# Patient Record
Sex: FEMALE | Race: WHITE | NOT HISPANIC OR LATINO | Employment: OTHER | URBAN - METROPOLITAN AREA
[De-identification: names, ages, dates, MRNs, and addresses within clinical notes are randomized per-mention and may not be internally consistent; named-entity substitution may affect disease eponyms.]

---

## 2017-01-03 ENCOUNTER — COMMUNICATION - HEALTHEAST (OUTPATIENT)
Dept: TELEHEALTH | Facility: CLINIC | Age: 37
End: 2017-01-03

## 2017-01-03 ENCOUNTER — PRENATAL OFFICE VISIT - HEALTHEAST (OUTPATIENT)
Dept: FAMILY MEDICINE | Facility: CLINIC | Age: 37
End: 2017-01-03

## 2017-01-03 DIAGNOSIS — Z34.92 SUPERVISION OF NORMAL PREGNANCY IN SECOND TRIMESTER: ICD-10-CM

## 2017-01-06 ENCOUNTER — HOSPITAL ENCOUNTER (OUTPATIENT)
Dept: ULTRASOUND IMAGING | Facility: CLINIC | Age: 37
Discharge: HOME | End: 2017-01-06
Attending: FAMILY MEDICINE

## 2017-01-06 DIAGNOSIS — Z34.92 SUPERVISION OF NORMAL PREGNANCY IN SECOND TRIMESTER: ICD-10-CM

## 2017-01-31 ENCOUNTER — PRENATAL OFFICE VISIT - HEALTHEAST (OUTPATIENT)
Dept: FAMILY MEDICINE | Facility: CLINIC | Age: 37
End: 2017-01-31

## 2017-01-31 DIAGNOSIS — Z34.92 SUPERVISION OF NORMAL PREGNANCY IN SECOND TRIMESTER: ICD-10-CM

## 2017-02-21 ENCOUNTER — PRENATAL OFFICE VISIT - HEALTHEAST (OUTPATIENT)
Dept: FAMILY MEDICINE | Facility: CLINIC | Age: 37
End: 2017-02-21

## 2017-02-21 ENCOUNTER — RECORDS - HEALTHEAST (OUTPATIENT)
Dept: ADMINISTRATIVE | Facility: OTHER | Age: 37
End: 2017-02-21

## 2017-02-21 DIAGNOSIS — Z34.92 SUPERVISION OF NORMAL PREGNANCY IN SECOND TRIMESTER: ICD-10-CM

## 2017-02-21 DIAGNOSIS — Z67.91 RH NEGATIVE STATUS DURING PREGNANCY, THIRD TRIMESTER: ICD-10-CM

## 2017-02-21 DIAGNOSIS — O26.893 RH NEGATIVE STATUS DURING PREGNANCY, THIRD TRIMESTER: ICD-10-CM

## 2017-02-22 ENCOUNTER — COMMUNICATION - HEALTHEAST (OUTPATIENT)
Dept: FAMILY MEDICINE | Facility: CLINIC | Age: 37
End: 2017-02-22

## 2017-02-22 LAB — SYPHILIS RPR SCREEN - HISTORICAL: NORMAL

## 2017-03-20 ENCOUNTER — HOSPITAL ENCOUNTER (OUTPATIENT)
Dept: ULTRASOUND IMAGING | Facility: CLINIC | Age: 37
Discharge: HOME | End: 2017-03-20
Attending: FAMILY MEDICINE

## 2017-03-20 DIAGNOSIS — Z34.92 SUPERVISION OF NORMAL PREGNANCY IN SECOND TRIMESTER: ICD-10-CM

## 2017-03-21 ENCOUNTER — PRENATAL OFFICE VISIT - HEALTHEAST (OUTPATIENT)
Dept: FAMILY MEDICINE | Facility: CLINIC | Age: 37
End: 2017-03-21

## 2017-03-21 DIAGNOSIS — Z34.92 SUPERVISION OF NORMAL PREGNANCY IN SECOND TRIMESTER: ICD-10-CM

## 2017-04-04 ENCOUNTER — COMMUNICATION - HEALTHEAST (OUTPATIENT)
Dept: FAMILY MEDICINE | Facility: CLINIC | Age: 37
End: 2017-04-04

## 2017-04-06 ENCOUNTER — COMMUNICATION - HEALTHEAST (OUTPATIENT)
Dept: FAMILY MEDICINE | Facility: CLINIC | Age: 37
End: 2017-04-06

## 2017-04-07 ENCOUNTER — PRENATAL OFFICE VISIT - HEALTHEAST (OUTPATIENT)
Dept: FAMILY MEDICINE | Facility: CLINIC | Age: 37
End: 2017-04-07

## 2017-04-07 DIAGNOSIS — Z34.92 SUPERVISION OF NORMAL PREGNANCY IN SECOND TRIMESTER: ICD-10-CM

## 2017-04-07 DIAGNOSIS — R11.0 NAUSEA: ICD-10-CM

## 2017-04-07 LAB
ALT SERPL W P-5'-P-CCNC: 23 U/L (ref 0–45)
AST SERPL W P-5'-P-CCNC: 20 U/L (ref 0–40)
CREAT SERPL-MCNC: 0.73 MG/DL (ref 0.6–1.1)
GFR SERPL CREATININE-BSD FRML MDRD: >60 ML/MIN/1.73M2

## 2017-04-17 ENCOUNTER — PRENATAL OFFICE VISIT - HEALTHEAST (OUTPATIENT)
Dept: FAMILY MEDICINE | Facility: CLINIC | Age: 37
End: 2017-04-17

## 2017-04-17 DIAGNOSIS — Z34.92 SUPERVISION OF NORMAL PREGNANCY IN SECOND TRIMESTER: ICD-10-CM

## 2017-04-24 ENCOUNTER — COMMUNICATION - HEALTHEAST (OUTPATIENT)
Dept: TELEHEALTH | Facility: CLINIC | Age: 37
End: 2017-04-24

## 2017-04-24 ENCOUNTER — PRENATAL OFFICE VISIT - HEALTHEAST (OUTPATIENT)
Dept: FAMILY MEDICINE | Facility: CLINIC | Age: 37
End: 2017-04-24

## 2017-04-24 DIAGNOSIS — Z34.92 SUPERVISION OF NORMAL PREGNANCY IN SECOND TRIMESTER: ICD-10-CM

## 2017-05-01 ENCOUNTER — PRENATAL OFFICE VISIT - HEALTHEAST (OUTPATIENT)
Dept: FAMILY MEDICINE | Facility: CLINIC | Age: 37
End: 2017-05-01

## 2017-05-01 DIAGNOSIS — Z34.83 ENCOUNTER FOR SUPERVISION OF OTHER NORMAL PREGNANCY IN THIRD TRIMESTER: ICD-10-CM

## 2017-05-01 DIAGNOSIS — Z64.1 GRAND MULTIPARITY: ICD-10-CM

## 2017-05-04 ENCOUNTER — COMMUNICATION - HEALTHEAST (OUTPATIENT)
Dept: SCHEDULING | Facility: CLINIC | Age: 37
End: 2017-05-04

## 2017-05-04 ENCOUNTER — HOSPITAL ENCOUNTER (OUTPATIENT)
Dept: MEDSURG UNIT | Facility: CLINIC | Age: 37
Discharge: HOME | End: 2017-05-04
Attending: FAMILY MEDICINE | Admitting: FAMILY MEDICINE

## 2017-05-08 ENCOUNTER — PRENATAL OFFICE VISIT - HEALTHEAST (OUTPATIENT)
Dept: FAMILY MEDICINE | Facility: CLINIC | Age: 37
End: 2017-05-08

## 2017-05-08 DIAGNOSIS — Z34.92 SUPERVISION OF NORMAL PREGNANCY IN SECOND TRIMESTER: ICD-10-CM

## 2017-05-15 ENCOUNTER — ANESTHESIA - HEALTHEAST (OUTPATIENT)
Dept: OBGYN | Facility: CLINIC | Age: 37
End: 2017-05-15

## 2017-05-15 ENCOUNTER — HOSPITAL ENCOUNTER (OUTPATIENT)
Dept: MEDSURG UNIT | Facility: CLINIC | Age: 37
Discharge: HOME | End: 2017-05-15
Attending: FAMILY MEDICINE | Admitting: FAMILY MEDICINE

## 2017-05-15 ASSESSMENT — MIFFLIN-ST. JEOR: SCORE: 1684.66

## 2017-05-24 ENCOUNTER — COMMUNICATION - HEALTHEAST (OUTPATIENT)
Dept: OBGYN | Facility: CLINIC | Age: 37
End: 2017-05-24

## 2017-06-01 ENCOUNTER — COMMUNICATION - HEALTHEAST (OUTPATIENT)
Dept: OBGYN | Facility: CLINIC | Age: 37
End: 2017-06-01

## 2017-06-27 ENCOUNTER — OFFICE VISIT - HEALTHEAST (OUTPATIENT)
Dept: FAMILY MEDICINE | Facility: CLINIC | Age: 37
End: 2017-06-27

## 2017-06-27 DIAGNOSIS — Z30.09 ENCOUNTER FOR OTHER GENERAL COUNSELING OR ADVICE ON CONTRACEPTION: ICD-10-CM

## 2017-06-27 DIAGNOSIS — N89.8 VAGINAL DISCHARGE: ICD-10-CM

## 2017-06-27 DIAGNOSIS — F41.1 ANXIETY STATE: ICD-10-CM

## 2017-06-27 DIAGNOSIS — F34.1 DYSTHYMIC DISORDER: ICD-10-CM

## 2017-06-27 ASSESSMENT — MIFFLIN-ST. JEOR: SCORE: 1648.83

## 2017-09-19 ENCOUNTER — COMMUNICATION - HEALTHEAST (OUTPATIENT)
Dept: FAMILY MEDICINE | Facility: CLINIC | Age: 37
End: 2017-09-19

## 2017-09-26 ENCOUNTER — AMBULATORY - HEALTHEAST (OUTPATIENT)
Dept: FAMILY MEDICINE | Facility: CLINIC | Age: 37
End: 2017-09-26

## 2017-09-26 ENCOUNTER — AMBULATORY - HEALTHEAST (OUTPATIENT)
Dept: NURSING | Facility: CLINIC | Age: 37
End: 2017-09-26

## 2017-09-26 DIAGNOSIS — Z30.9 CONTRACEPTION MANAGEMENT: ICD-10-CM

## 2017-10-20 ENCOUNTER — COMMUNICATION - HEALTHEAST (OUTPATIENT)
Dept: FAMILY MEDICINE | Facility: CLINIC | Age: 37
End: 2017-10-20

## 2017-10-20 DIAGNOSIS — F34.1 DYSTHYMIC DISORDER: ICD-10-CM

## 2017-10-26 ENCOUNTER — COMMUNICATION - HEALTHEAST (OUTPATIENT)
Dept: SCHEDULING | Facility: CLINIC | Age: 37
End: 2017-10-26

## 2017-12-27 ENCOUNTER — COMMUNICATION - HEALTHEAST (OUTPATIENT)
Dept: FAMILY MEDICINE | Facility: CLINIC | Age: 37
End: 2017-12-27

## 2017-12-27 DIAGNOSIS — F34.1 DYSTHYMIC DISORDER: ICD-10-CM

## 2018-01-30 ENCOUNTER — COMMUNICATION - HEALTHEAST (OUTPATIENT)
Dept: FAMILY MEDICINE | Facility: CLINIC | Age: 38
End: 2018-01-30

## 2018-01-30 DIAGNOSIS — F34.1 DYSTHYMIC DISORDER: ICD-10-CM

## 2018-02-09 ENCOUNTER — OFFICE VISIT - HEALTHEAST (OUTPATIENT)
Dept: FAMILY MEDICINE | Facility: CLINIC | Age: 38
End: 2018-02-09

## 2018-02-09 DIAGNOSIS — Z30.9 CONTRACEPTIVE MANAGEMENT: ICD-10-CM

## 2018-02-09 DIAGNOSIS — F34.1 DYSTHYMIC DISORDER: ICD-10-CM

## 2018-02-09 LAB
HCG UR QL: NEGATIVE
SP GR UR STRIP: 1.02 (ref 1–1.03)

## 2018-02-09 ASSESSMENT — MIFFLIN-ST. JEOR: SCORE: 1637.04

## 2018-02-15 ENCOUNTER — OFFICE VISIT - HEALTHEAST (OUTPATIENT)
Dept: FAMILY MEDICINE | Facility: CLINIC | Age: 38
End: 2018-02-15

## 2018-02-15 DIAGNOSIS — Z97.5 IUD (INTRAUTERINE DEVICE) IN PLACE: ICD-10-CM

## 2018-02-15 DIAGNOSIS — Z30.49 ENCOUNTER FOR SURVEILLANCE OF OTHER CONTRACEPTIVE: ICD-10-CM

## 2018-02-15 DIAGNOSIS — Z30.430 ENCOUNTER FOR IUD INSERTION: ICD-10-CM

## 2018-02-15 LAB
HCG UR QL: NEGATIVE
SP GR UR STRIP: 1.02 (ref 1–1.03)

## 2018-03-01 ENCOUNTER — COMMUNICATION - HEALTHEAST (OUTPATIENT)
Dept: FAMILY MEDICINE | Facility: CLINIC | Age: 38
End: 2018-03-01

## 2018-03-01 DIAGNOSIS — F34.1 DYSTHYMIC DISORDER: ICD-10-CM

## 2018-03-30 ENCOUNTER — COMMUNICATION - HEALTHEAST (OUTPATIENT)
Dept: FAMILY MEDICINE | Facility: CLINIC | Age: 38
End: 2018-03-30

## 2018-03-30 DIAGNOSIS — F34.1 DYSTHYMIC DISORDER: ICD-10-CM

## 2018-05-22 ENCOUNTER — OFFICE VISIT - HEALTHEAST (OUTPATIENT)
Dept: FAMILY MEDICINE | Facility: CLINIC | Age: 38
End: 2018-05-22

## 2018-05-22 DIAGNOSIS — J02.9 SORE THROAT: ICD-10-CM

## 2018-05-22 DIAGNOSIS — J03.90 ACUTE TONSILLITIS: ICD-10-CM

## 2018-05-22 DIAGNOSIS — R05.9 COUGH: ICD-10-CM

## 2018-05-22 LAB — DEPRECATED S PYO AG THROAT QL EIA: NORMAL

## 2018-05-22 ASSESSMENT — MIFFLIN-ST. JEOR: SCORE: 1651.5

## 2018-05-23 LAB — GROUP A STREP BY PCR: NORMAL

## 2018-05-31 ENCOUNTER — COMMUNICATION - HEALTHEAST (OUTPATIENT)
Dept: FAMILY MEDICINE | Facility: CLINIC | Age: 38
End: 2018-05-31

## 2018-05-31 DIAGNOSIS — F34.1 DYSTHYMIC DISORDER: ICD-10-CM

## 2018-06-15 ENCOUNTER — OFFICE VISIT - HEALTHEAST (OUTPATIENT)
Dept: FAMILY MEDICINE | Facility: CLINIC | Age: 38
End: 2018-06-15

## 2018-06-15 ENCOUNTER — HOSPITAL ENCOUNTER (OUTPATIENT)
Dept: ULTRASOUND IMAGING | Facility: CLINIC | Age: 38
Discharge: HOME | End: 2018-06-15
Attending: FAMILY MEDICINE

## 2018-06-15 DIAGNOSIS — Z30.431 IUD CHECK UP: ICD-10-CM

## 2018-06-15 DIAGNOSIS — N81.2 INCOMPLETE UTEROVAGINAL PROLAPSE: ICD-10-CM

## 2018-06-15 ASSESSMENT — MIFFLIN-ST. JEOR: SCORE: 1646.11

## 2018-07-08 ENCOUNTER — RECORDS - HEALTHEAST (OUTPATIENT)
Dept: ADMINISTRATIVE | Facility: OTHER | Age: 38
End: 2018-07-08

## 2018-09-11 ENCOUNTER — AMBULATORY - HEALTHEAST (OUTPATIENT)
Dept: FAMILY MEDICINE | Facility: CLINIC | Age: 38
End: 2018-09-11

## 2018-09-11 DIAGNOSIS — N61.0 MASTITIS, LEFT, ACUTE: ICD-10-CM

## 2019-02-21 ENCOUNTER — COMMUNICATION - HEALTHEAST (OUTPATIENT)
Dept: FAMILY MEDICINE | Facility: CLINIC | Age: 39
End: 2019-02-21

## 2019-02-21 ENCOUNTER — AMBULATORY - HEALTHEAST (OUTPATIENT)
Dept: FAMILY MEDICINE | Facility: CLINIC | Age: 39
End: 2019-02-21

## 2019-02-21 DIAGNOSIS — F34.1 DYSTHYMIC DISORDER: ICD-10-CM

## 2019-06-19 ENCOUNTER — COMMUNICATION - HEALTHEAST (OUTPATIENT)
Dept: FAMILY MEDICINE | Facility: CLINIC | Age: 39
End: 2019-06-19

## 2019-06-19 DIAGNOSIS — F34.1 DYSTHYMIC DISORDER: ICD-10-CM

## 2019-07-20 ENCOUNTER — COMMUNICATION - HEALTHEAST (OUTPATIENT)
Dept: FAMILY MEDICINE | Facility: CLINIC | Age: 39
End: 2019-07-20

## 2019-07-20 DIAGNOSIS — F34.1 DYSTHYMIC DISORDER: ICD-10-CM

## 2019-08-08 ENCOUNTER — COMMUNICATION - HEALTHEAST (OUTPATIENT)
Dept: FAMILY MEDICINE | Facility: CLINIC | Age: 39
End: 2019-08-08

## 2019-08-08 DIAGNOSIS — F34.1 DYSTHYMIC DISORDER: ICD-10-CM

## 2019-09-17 ENCOUNTER — COMMUNICATION - HEALTHEAST (OUTPATIENT)
Dept: FAMILY MEDICINE | Facility: CLINIC | Age: 39
End: 2019-09-17

## 2019-09-17 DIAGNOSIS — F34.1 DYSTHYMIC DISORDER: ICD-10-CM

## 2019-10-14 ENCOUNTER — COMMUNICATION - HEALTHEAST (OUTPATIENT)
Dept: FAMILY MEDICINE | Facility: CLINIC | Age: 39
End: 2019-10-14

## 2019-10-14 DIAGNOSIS — F34.1 DYSTHYMIC DISORDER: ICD-10-CM

## 2019-11-04 ENCOUNTER — COMMUNICATION - HEALTHEAST (OUTPATIENT)
Dept: FAMILY MEDICINE | Facility: CLINIC | Age: 39
End: 2019-11-04

## 2019-11-04 DIAGNOSIS — F34.1 DYSTHYMIC DISORDER: ICD-10-CM

## 2019-11-21 ENCOUNTER — AMBULATORY - HEALTHEAST (OUTPATIENT)
Dept: NURSING | Facility: CLINIC | Age: 39
End: 2019-11-21

## 2019-11-21 ENCOUNTER — COMMUNICATION - HEALTHEAST (OUTPATIENT)
Dept: FAMILY MEDICINE | Facility: CLINIC | Age: 39
End: 2019-11-21

## 2019-11-21 DIAGNOSIS — F34.1 DYSTHYMIC DISORDER: ICD-10-CM

## 2019-12-20 ENCOUNTER — COMMUNICATION - HEALTHEAST (OUTPATIENT)
Dept: FAMILY MEDICINE | Facility: CLINIC | Age: 39
End: 2019-12-20

## 2019-12-20 DIAGNOSIS — F34.1 DYSTHYMIC DISORDER: ICD-10-CM

## 2020-01-08 ENCOUNTER — RECORDS - HEALTHEAST (OUTPATIENT)
Dept: ADMINISTRATIVE | Facility: OTHER | Age: 40
End: 2020-01-08

## 2020-01-08 ENCOUNTER — COMMUNICATION - HEALTHEAST (OUTPATIENT)
Dept: SCHEDULING | Facility: CLINIC | Age: 40
End: 2020-01-08

## 2020-02-13 ENCOUNTER — OFFICE VISIT - HEALTHEAST (OUTPATIENT)
Dept: FAMILY MEDICINE | Facility: CLINIC | Age: 40
End: 2020-02-13

## 2020-02-13 DIAGNOSIS — F34.1 DYSTHYMIC DISORDER: ICD-10-CM

## 2020-02-13 ASSESSMENT — PATIENT HEALTH QUESTIONNAIRE - PHQ9: SUM OF ALL RESPONSES TO PHQ QUESTIONS 1-9: 23

## 2020-02-13 ASSESSMENT — MIFFLIN-ST. JEOR: SCORE: 1684.67

## 2020-05-19 ENCOUNTER — COMMUNICATION - HEALTHEAST (OUTPATIENT)
Dept: SCHEDULING | Facility: CLINIC | Age: 40
End: 2020-05-19

## 2020-05-19 ENCOUNTER — OFFICE VISIT - HEALTHEAST (OUTPATIENT)
Dept: FAMILY MEDICINE | Facility: CLINIC | Age: 40
End: 2020-05-19

## 2020-05-19 DIAGNOSIS — N93.9 VAGINAL SPOTTING: ICD-10-CM

## 2020-05-19 DIAGNOSIS — Z30.431 IUD CHECK UP: ICD-10-CM

## 2020-05-19 ASSESSMENT — PATIENT HEALTH QUESTIONNAIRE - PHQ9: SUM OF ALL RESPONSES TO PHQ QUESTIONS 1-9: 10

## 2020-08-08 ENCOUNTER — COMMUNICATION - HEALTHEAST (OUTPATIENT)
Dept: FAMILY MEDICINE | Facility: CLINIC | Age: 40
End: 2020-08-08

## 2020-08-08 DIAGNOSIS — F34.1 DYSTHYMIC DISORDER: ICD-10-CM

## 2020-11-05 ENCOUNTER — RECORDS - HEALTHEAST (OUTPATIENT)
Dept: ADMINISTRATIVE | Facility: OTHER | Age: 40
End: 2020-11-05

## 2021-05-10 ENCOUNTER — COMMUNICATION - HEALTHEAST (OUTPATIENT)
Dept: FAMILY MEDICINE | Facility: CLINIC | Age: 41
End: 2021-05-10

## 2021-05-10 DIAGNOSIS — F34.1 DYSTHYMIC DISORDER: ICD-10-CM

## 2021-05-11 RX ORDER — BUPROPION HYDROCHLORIDE 300 MG/1
TABLET ORAL
Qty: 90 TABLET | Refills: 0 | Status: SHIPPED | OUTPATIENT
Start: 2021-05-11 | End: 2021-08-12

## 2021-05-26 ASSESSMENT — PATIENT HEALTH QUESTIONNAIRE - PHQ9: SUM OF ALL RESPONSES TO PHQ QUESTIONS 1-9: 10

## 2021-05-27 ASSESSMENT — PATIENT HEALTH QUESTIONNAIRE - PHQ9: SUM OF ALL RESPONSES TO PHQ QUESTIONS 1-9: 23

## 2021-05-29 NOTE — TELEPHONE ENCOUNTER
RN cannot approve Refill Request    RN can NOT refill this medication overdue for office visits and/or labs.    Dakotah Mcadams, [facility] Triage/Med Refill 6/19/2019    Requested Prescriptions   Pending Prescriptions Disp Refills     buPROPion (WELLBUTRIN XL) 150 MG 24 hr tablet [Pharmacy Med Name: BUPROPION XL 150MG TABLETS (24 H)] 30 tablet 0     Sig: TAKE 1 TABLET BY MOUTH EVERY MORNING       Tricyclics/Misc Antidepressant/Antianxiety Meds Refill Protocol Failed - 6/19/2019  3:26 AM        Failed - PCP or prescribing provider visit in last year     Last office visit with prescriber/PCP: 6/15/2018 Nayeli Webb MD OR same dept: Visit date not found OR same specialty: 6/15/2018 Nayeli Webb MD  Last physical: Visit date not found Last MTM visit: Visit date not found   Next visit within 3 mo: Visit date not found  Next physical within 3 mo: Visit date not found  Prescriber OR PCP: Nayeli Webb MD  Last diagnosis associated with med order: 1. Dysthymic disorder  - buPROPion (WELLBUTRIN XL) 150 MG 24 hr tablet [Pharmacy Med Name: BUPROPION XL 150MG TABLETS (24 H)]; TAKE 1 TABLET BY MOUTH EVERY MORNING  Dispense: 30 tablet; Refill: 0    If protocol passes may refill for 12 months if within 3 months of last provider visit (or a total of 15 months).

## 2021-05-30 VITALS — BODY MASS INDEX: 31.66 KG/M2 | WEIGHT: 208.25 LBS

## 2021-05-30 VITALS — BODY MASS INDEX: 32.39 KG/M2 | WEIGHT: 213 LBS

## 2021-05-30 VITALS — WEIGHT: 213 LBS | BODY MASS INDEX: 32.28 KG/M2 | HEIGHT: 68 IN

## 2021-05-30 VITALS — WEIGHT: 205.06 LBS | BODY MASS INDEX: 31.18 KG/M2

## 2021-05-30 VITALS — WEIGHT: 210.19 LBS | BODY MASS INDEX: 31.96 KG/M2

## 2021-05-30 VITALS — BODY MASS INDEX: 30.78 KG/M2 | WEIGHT: 202.44 LBS

## 2021-05-30 VITALS — BODY MASS INDEX: 29.38 KG/M2 | WEIGHT: 193.25 LBS

## 2021-05-30 VITALS — BODY MASS INDEX: 32.4 KG/M2 | WEIGHT: 213.06 LBS

## 2021-05-30 VITALS — BODY MASS INDEX: 31.68 KG/M2 | WEIGHT: 208.38 LBS

## 2021-05-30 NOTE — TELEPHONE ENCOUNTER
Refill Given    Refill given per Policy, patient informed they are overdue for Office Visit   OV 6/15/18    Desiree Babcock, [facility] Connection Triage/Med Refill 7/20/2019    Requested Prescriptions   Pending Prescriptions Disp Refills     buPROPion (WELLBUTRIN XL) 150 MG 24 hr tablet [Pharmacy Med Name: BUPROPION XL 150MG TABLETS (24 H)] 30 tablet 0     Sig: TAKE 1 TABLET BY MOUTH EVERY MORNING       Tricyclics/Misc Antidepressant/Antianxiety Meds Refill Protocol Failed - 7/20/2019  3:26 AM        Failed - PCP or prescribing provider visit in last year     Last office visit with prescriber/PCP: Visit date not found OR same dept: Visit date not found OR same specialty: 6/15/2018 Nayeli Webb MD  Last physical: Visit date not found Last MTM visit: Visit date not found   Next visit within 3 mo: Visit date not found  Next physical within 3 mo: Visit date not found  Prescriber OR PCP: Familia Tam MD  Last diagnosis associated with med order: 1. Dysthymic disorder  - buPROPion (WELLBUTRIN XL) 150 MG 24 hr tablet [Pharmacy Med Name: BUPROPION XL 150MG TABLETS (24 H)]; TAKE 1 TABLET BY MOUTH EVERY MORNING  Dispense: 30 tablet; Refill: 0    If protocol passes may refill for 12 months if within 3 months of last provider visit (or a total of 15 months).

## 2021-05-31 ENCOUNTER — RECORDS - HEALTHEAST (OUTPATIENT)
Dept: ADMINISTRATIVE | Facility: CLINIC | Age: 41
End: 2021-05-31

## 2021-05-31 VITALS — WEIGHT: 205.1 LBS | BODY MASS INDEX: 31.08 KG/M2 | HEIGHT: 68 IN

## 2021-05-31 VITALS — WEIGHT: 213.9 LBS | BODY MASS INDEX: 33.5 KG/M2

## 2021-05-31 VITALS — BODY MASS INDEX: 32.33 KG/M2 | WEIGHT: 206 LBS | HEIGHT: 67 IN

## 2021-05-31 NOTE — TELEPHONE ENCOUNTER
RN cannot approve Refill Request    RN can NOT refill this medication Protocol failed and NO refill given. Last office visit: 6/15/2018 Nayeli Webb MD Last Physical: Visit date not found Last MTM visit: Visit date not found Last visit same specialty: 6/15/2018 Nayeli Webb MD.  Next visit within 3 mo: Visit date not found  Next physical within 3 mo: Visit date not found      Barbi River, [facility] Triage/Med Refill 8/10/2019    Requested Prescriptions   Pending Prescriptions Disp Refills     buPROPion (WELLBUTRIN XL) 150 MG 24 hr tablet [Pharmacy Med Name: BUPROPION XL 150MG TABLETS (24 H)] 30 tablet 0     Sig: TAKE 1 TABLET(150 MG) BY MOUTH EVERY MORNING       Tricyclics/Misc Antidepressant/Antianxiety Meds Refill Protocol Failed - 8/8/2019 12:04 PM        Failed - PCP or prescribing provider visit in last year     Last office visit with prescriber/PCP: 6/15/2018 Nayeli Webb MD OR same dept: Visit date not found OR same specialty: 6/15/2018 Nayeli Webb MD  Last physical: Visit date not found Last MTM visit: Visit date not found   Next visit within 3 mo: Visit date not found  Next physical within 3 mo: Visit date not found  Prescriber OR PCP: Nayeli Webb MD  Last diagnosis associated with med order: 1. Dysthymic disorder  - buPROPion (WELLBUTRIN XL) 150 MG 24 hr tablet [Pharmacy Med Name: BUPROPION XL 150MG TABLETS (24 H)]; TAKE 1 TABLET(150 MG) BY MOUTH EVERY MORNING  Dispense: 30 tablet; Refill: 0  - sertraline (ZOLOFT) 100 MG tablet [Pharmacy Med Name: SERTRALINE 100MG TABLETS]; TAKE 1 TABLET(100 MG) BY MOUTH DAILY  Dispense: 30 tablet; Refill: 0    If protocol passes may refill for 12 months if within 3 months of last provider visit (or a total of 15 months).             sertraline (ZOLOFT) 100 MG tablet [Pharmacy Med Name: SERTRALINE 100MG TABLETS] 30 tablet 0     Sig: TAKE 1 TABLET(100 MG) BY MOUTH DAILY       [facility] Refill Protocol  Failed -  8/8/2019 12:04 PM        Failed - PCP or prescribing provider visit in last year     Last office visit with prescriber/PCP: 6/15/2018 Nayeli Webb MD OR same dept: Visit date not found OR same specialty: 6/15/2018 Nayeli Webb MD  Last physical: Visit date not found Last MTM visit: Visit date not found   Next visit within 3 mo: Visit date not found  Next physical within 3 mo: Visit date not found  Prescriber OR PCP: Nayeli Webb MD  Last diagnosis associated with med order: 1. Dysthymic disorder  - buPROPion (WELLBUTRIN XL) 150 MG 24 hr tablet [Pharmacy Med Name: BUPROPION XL 150MG TABLETS (24 H)]; TAKE 1 TABLET(150 MG) BY MOUTH EVERY MORNING  Dispense: 30 tablet; Refill: 0  - sertraline (ZOLOFT) 100 MG tablet [Pharmacy Med Name: SERTRALINE 100MG TABLETS]; TAKE 1 TABLET(100 MG) BY MOUTH DAILY  Dispense: 30 tablet; Refill: 0    If protocol passes may refill for 12 months if within 3 months of last provider visit (or a total of 15 months).

## 2021-05-31 NOTE — TELEPHONE ENCOUNTER
Left message for the patient requesting she calls back or schedules via [facility] for a apt when Dr. Webb returns to clinic.

## 2021-05-31 NOTE — TELEPHONE ENCOUNTER
Patient states that she going out of town for three week requesting early refill.  Controlled Substance Refill Request  Medication Name:   Requested Prescriptions     Pending Prescriptions Disp Refills     buPROPion (WELLBUTRIN XL) 150 MG 24 hr tablet 30 tablet 0     Sig: Take 1 tablet (150 mg total) by mouth every morning.     sertraline (ZOLOFT) 100 MG tablet 30 tablet 5     Sig: Take 1 tablet (100 mg total) by mouth daily.     Date Last Fill: 7/20/19  Pharmacy: [facility] #[id number]      Submit electronically to pharmacy  Controlled Substance Agreement Date Scanned:   Encounter-Level CSA Scan Date:    There are no encounter-level csa scan date.       Last office visit with prescriber/PCP: 6/15/2018 Nayeli Webb MD OR same dept: Visit date not found OR same specialty: 6/15/2018 Nayeli Webb MD  Last physical: Visit date not found Last MTM visit: Visit date not found

## 2021-05-31 NOTE — TELEPHONE ENCOUNTER
Please call the patient.    Overdue to be seen.  30 stay sent, but this is absolutely her last fill until being seen.    Tyree Hobson, CNP

## 2021-05-31 NOTE — TELEPHONE ENCOUNTER
Please let the patient know that her refills were sent over last week, but she is overdue for an appointment, can we help her schedule this

## 2021-06-01 ENCOUNTER — RECORDS - HEALTHEAST (OUTPATIENT)
Dept: ADMINISTRATIVE | Facility: CLINIC | Age: 41
End: 2021-06-01

## 2021-06-01 VITALS — WEIGHT: 209.19 LBS | BODY MASS INDEX: 32.83 KG/M2 | HEIGHT: 67 IN

## 2021-06-01 VITALS — WEIGHT: 208 LBS | HEIGHT: 67 IN | BODY MASS INDEX: 32.65 KG/M2

## 2021-06-02 ENCOUNTER — RECORDS - HEALTHEAST (OUTPATIENT)
Dept: ADMINISTRATIVE | Facility: CLINIC | Age: 41
End: 2021-06-02

## 2021-06-02 NOTE — TELEPHONE ENCOUNTER
RN cannot approve Refill Request    RN can NOT refill this medication Protocol failed and NO refill given.    Anh Purvis, [facility] Triage/Med Refill 10/15/2019    Requested Prescriptions   Pending Prescriptions Disp Refills     sertraline (ZOLOFT) 100 MG tablet [Pharmacy Med Name: SERTRALINE 100MG TABLETS] 30 tablet 0     Sig: TAKE ONE TABLET BY MOUTH ONCE DAILY       [facility] Refill Protocol  Failed - 10/14/2019  3:28 AM        Failed - PCP or prescribing provider visit in last year     Last office visit with prescriber/PCP: 6/15/2018 Nayeli Webb MD OR same dept: Visit date not found OR same specialty: 6/15/2018 Nayeli Webb MD  Last physical: Visit date not found Last MTM visit: Visit date not found   Next visit within 3 mo: Visit date not found  Next physical within 3 mo: Visit date not found  Prescriber OR PCP: Nayeli Webb MD  Last diagnosis associated with med order: 1. Dysthymic disorder  - sertraline (ZOLOFT) 100 MG tablet [Pharmacy Med Name: SERTRALINE 100MG TABLETS]; TAKE ONE TABLET BY MOUTH ONCE DAILY  Dispense: 30 tablet; Refill: 0  - buPROPion (WELLBUTRIN XL) 150 MG 24 hr tablet [Pharmacy Med Name: BUPROPION XL 150MG TABLETS (24 H)]; TAKE ONE TABLET BY MOUTH EVERY MORNING  Dispense: 30 tablet; Refill: 0    If protocol passes may refill for 12 months if within 3 months of last provider visit (or a total of 15 months).             buPROPion (WELLBUTRIN XL) 150 MG 24 hr tablet [Pharmacy Med Name: BUPROPION XL 150MG TABLETS (24 H)] 30 tablet 0     Sig: TAKE ONE TABLET BY MOUTH EVERY MORNING       Tricyclics/Misc Antidepressant/Antianxiety Meds Refill Protocol Failed - 10/14/2019  3:28 AM        Failed - PCP or prescribing provider visit in last year     Last office visit with prescriber/PCP: 6/15/2018 Nayeli Webb MD OR same dept: Visit date not found OR same specialty: 6/15/2018 Nayeli Webb MD  Last physical: Visit date not found Last MTM visit:  Visit date not found   Next visit within 3 mo: Visit date not found  Next physical within 3 mo: Visit date not found  Prescriber OR PCP: Nayeli Webb MD  Last diagnosis associated with med order: 1. Dysthymic disorder  - sertraline (ZOLOFT) 100 MG tablet [Pharmacy Med Name: SERTRALINE 100MG TABLETS]; TAKE ONE TABLET BY MOUTH ONCE DAILY  Dispense: 30 tablet; Refill: 0  - buPROPion (WELLBUTRIN XL) 150 MG 24 hr tablet [Pharmacy Med Name: BUPROPION XL 150MG TABLETS (24 H)]; TAKE ONE TABLET BY MOUTH EVERY MORNING  Dispense: 30 tablet; Refill: 0    If protocol passes may refill for 12 months if within 3 months of last provider visit (or a total of 15 months).

## 2021-06-03 NOTE — TELEPHONE ENCOUNTER
Please CALL pt and notify: OVERDUE FOR VISIT. PLEASE SCHEDULE MED CHECK PRIOR TO NEXT FILL. NO FURTHER REFILLS IF NOT SEEN.

## 2021-06-03 NOTE — TELEPHONE ENCOUNTER
RN cannot approve Refill Request    RN can NOT refill this medication ---> PCP messaged that patient is overdue for Office Visit   Last office visit: 6/15/2018  No pending appt.    Denia Osullivan, [facility] Connection Triage/Med Refill 11/4/2019    Requested Prescriptions   Pending Prescriptions Disp Refills     sertraline (ZOLOFT) 100 MG tablet [Pharmacy Med Name: SERTRALINE 100MG TABLETS] 15 tablet 0     Sig: TAKE 1 TABLET BY MOUTH EVERY DAY       [facility] Refill Protocol  Failed - 11/4/2019  3:30 AM        Failed - PCP or prescribing provider visit in last year     Last office visit with prescriber/PCP: 6/15/2018 Nayeli Webb MD OR same dept: Visit date not found OR same specialty: 6/15/2018 Nayeli Webb MD  Last physical: Visit date not found Last MTM visit: Visit date not found   Next visit within 3 mo: Visit date not found  Next physical within 3 mo: Visit date not found  Prescriber OR PCP: Nayeli Webb MD  Last diagnosis associated with med order: 1. Dysthymic disorder  - sertraline (ZOLOFT) 100 MG tablet [Pharmacy Med Name: SERTRALINE 100MG TABLETS]; TAKE 1 TABLET BY MOUTH EVERY DAY  Dispense: 15 tablet; Refill: 0  - buPROPion (WELLBUTRIN XL) 150 MG 24 hr tablet [Pharmacy Med Name: BUPROPION XL 150MG TABLETS (24 H)]; TAKE 1 TABLET BY MOUTH EVERY MORNING  Dispense: 15 tablet; Refill: 0    If protocol passes may refill for 12 months if within 3 months of last provider visit (or a total of 15 months).             buPROPion (WELLBUTRIN XL) 150 MG 24 hr tablet [Pharmacy Med Name: BUPROPION XL 150MG TABLETS (24 H)] 15 tablet 0     Sig: TAKE 1 TABLET BY MOUTH EVERY MORNING       Tricyclics/Misc Antidepressant/Antianxiety Meds Refill Protocol Failed - 11/4/2019  3:30 AM        Failed - PCP or prescribing provider visit in last year     Last office visit with prescriber/PCP: 6/15/2018 Nayeli Webb MD OR same dept: Visit date not found OR same specialty: 6/15/2018 Nayeli Webb  MD [place]  Last physical: Visit date not found Last MTM visit: Visit date not found   Next visit within 3 mo: Visit date not found  Next physical within 3 mo: Visit date not found  Prescriber OR PCP: Nayeli Webb MD  Last diagnosis associated with med order: 1. Dysthymic disorder  - sertraline (ZOLOFT) 100 MG tablet [Pharmacy Med Name: SERTRALINE 100MG TABLETS]; TAKE 1 TABLET BY MOUTH EVERY DAY  Dispense: 15 tablet; Refill: 0  - buPROPion (WELLBUTRIN XL) 150 MG 24 hr tablet [Pharmacy Med Name: BUPROPION XL 150MG TABLETS (24 H)]; TAKE 1 TABLET BY MOUTH EVERY MORNING  Dispense: 15 tablet; Refill: 0    If protocol passes may refill for 12 months if within 3 months of last provider visit (or a total of 15 months).

## 2021-06-03 NOTE — TELEPHONE ENCOUNTER
RN cannot approve Refill Request    RN can NOT refill this medication PCP messaged that patient is overdue for Office Visit. Last office visit: 6/15/2018 Nayeli Webb MD Last Physical: Visit date not found Last MTM visit: Visit date not found Last visit same specialty: 6/15/2018 Nayeli Webb MD.  Next visit within 3 mo: Visit date not found  Next physical within 3 mo: Visit date not found      Esha Lam, [facility] Triage/Med Refill 11/21/2019    Requested Prescriptions   Pending Prescriptions Disp Refills     sertraline (ZOLOFT) 100 MG tablet [Pharmacy Med Name: SERTRALINE 100MG TABLETS] 15 tablet 0     Sig: TAKE 1 TABLET BY MOUTH EVERY DAY       [facility] Refill Protocol  Failed - 11/21/2019  3:27 AM        Failed - PCP or prescribing provider visit in last year     Last office visit with prescriber/PCP: 6/15/2018 Nayeli Webb MD OR same dept: Visit date not found OR same specialty: 6/15/2018 Nayeli Webb MD  Last physical: Visit date not found Last MTM visit: Visit date not found   Next visit within 3 mo: Visit date not found  Next physical within 3 mo: Visit date not found  Prescriber OR PCP: Nayeli Webb MD  Last diagnosis associated with med order: 1. Dysthymic disorder  - sertraline (ZOLOFT) 100 MG tablet [Pharmacy Med Name: SERTRALINE 100MG TABLETS]; TAKE 1 TABLET BY MOUTH EVERY DAY  Dispense: 15 tablet; Refill: 0  - buPROPion (WELLBUTRIN XL) 150 MG 24 hr tablet [Pharmacy Med Name: BUPROPION XL 150MG TABLETS (24 H)]; TAKE 1 TABLET BY MOUTH EVERY MORNING  Dispense: 15 tablet; Refill: 0    If protocol passes may refill for 12 months if within 3 months of last provider visit (or a total of 15 months).             buPROPion (WELLBUTRIN XL) 150 MG 24 hr tablet [Pharmacy Med Name: BUPROPION XL 150MG TABLETS (24 H)] 15 tablet 0     Sig: TAKE 1 TABLET BY MOUTH EVERY MORNING       Tricyclics/Misc Antidepressant/Antianxiety Meds Refill Protocol Failed -  11/21/2019  3:27 AM        Failed - PCP or prescribing provider visit in last year     Last office visit with prescriber/PCP: 6/15/2018 Nayeli Webb MD OR same dept: Visit date not found OR same specialty: 6/15/2018 Nayeli Webb MD  Last physical: Visit date not found Last MTM visit: Visit date not found   Next visit within 3 mo: Visit date not found  Next physical within 3 mo: Visit date not found  Prescriber OR PCP: Nayeli Webb MD  Last diagnosis associated with med order: 1. Dysthymic disorder  - sertraline (ZOLOFT) 100 MG tablet [Pharmacy Med Name: SERTRALINE 100MG TABLETS]; TAKE 1 TABLET BY MOUTH EVERY DAY  Dispense: 15 tablet; Refill: 0  - buPROPion (WELLBUTRIN XL) 150 MG 24 hr tablet [Pharmacy Med Name: BUPROPION XL 150MG TABLETS (24 H)]; TAKE 1 TABLET BY MOUTH EVERY MORNING  Dispense: 15 tablet; Refill: 0    If protocol passes may refill for 12 months if within 3 months of last provider visit (or a total of 15 months).

## 2021-06-04 VITALS
DIASTOLIC BLOOD PRESSURE: 74 MMHG | BODY MASS INDEX: 33.98 KG/M2 | HEIGHT: 67 IN | SYSTOLIC BLOOD PRESSURE: 118 MMHG | HEART RATE: 104 BPM | WEIGHT: 216.5 LBS | OXYGEN SATURATION: 98 %

## 2021-06-04 VITALS
DIASTOLIC BLOOD PRESSURE: 82 MMHG | HEART RATE: 76 BPM | WEIGHT: 201.31 LBS | OXYGEN SATURATION: 99 % | BODY MASS INDEX: 31.53 KG/M2 | SYSTOLIC BLOOD PRESSURE: 124 MMHG

## 2021-06-04 NOTE — TELEPHONE ENCOUNTER
RN cannot approve Refill Request    RN can NOT refill this medication Protocol failed and NO refill given.       Anh Purvis, [facility] Triage/Med Refill 12/23/2019    Requested Prescriptions   Pending Prescriptions Disp Refills     sertraline (ZOLOFT) 100 MG tablet [Pharmacy Med Name: SERTRALINE 100MG TABLETS] 30 tablet 0     Sig: TAKE 1 TABLET BY MOUTH EVERY DAY       [facility] Refill Protocol  Failed - 12/20/2019  3:27 AM        Failed - PCP or prescribing provider visit in last year     Last office visit with prescriber/PCP: 6/15/2018 Nayeli Webb MD OR same dept: Visit date not found OR same specialty: 6/15/2018 Nayeli Webb MD  Last physical: Visit date not found Last MTM visit: Visit date not found   Next visit within 3 mo: Visit date not found  Next physical within 3 mo: Visit date not found  Prescriber OR PCP: Nayeli Webb MD  Last diagnosis associated with med order: 1. Dysthymic disorder  - sertraline (ZOLOFT) 100 MG tablet [Pharmacy Med Name: SERTRALINE 100MG TABLETS]; TAKE 1 TABLET BY MOUTH EVERY DAY  Dispense: 30 tablet; Refill: 0  - buPROPion (WELLBUTRIN XL) 150 MG 24 hr tablet [Pharmacy Med Name: BUPROPION XL 150MG TABLETS (24 H)]; TAKE 1 TABLET BY MOUTH EVERY MORNING  Dispense: 30 tablet; Refill: 0    If protocol passes may refill for 12 months if within 3 months of last provider visit (or a total of 15 months).             buPROPion (WELLBUTRIN XL) 150 MG 24 hr tablet [Pharmacy Med Name: BUPROPION XL 150MG TABLETS (24 H)] 30 tablet 0     Sig: TAKE 1 TABLET BY MOUTH EVERY MORNING       Tricyclics/Misc Antidepressant/Antianxiety Meds Refill Protocol Failed - 12/20/2019  3:27 AM        Failed - PCP or prescribing provider visit in last year     Last office visit with prescriber/PCP: 6/15/2018 Nayeli Webb MD OR same dept: Visit date not found OR same specialty: 6/15/2018 Nayeli Webb MD  Last physical: Visit date not found Last MTM visit: Visit  date not found   Next visit within 3 mo: Visit date not found  Next physical within 3 mo: Visit date not found  Prescriber OR PCP: Nayeli Webb MD  Last diagnosis associated with med order: 1. Dysthymic disorder  - sertraline (ZOLOFT) 100 MG tablet [Pharmacy Med Name: SERTRALINE 100MG TABLETS]; TAKE 1 TABLET BY MOUTH EVERY DAY  Dispense: 30 tablet; Refill: 0  - buPROPion (WELLBUTRIN XL) 150 MG 24 hr tablet [Pharmacy Med Name: BUPROPION XL 150MG TABLETS (24 H)]; TAKE 1 TABLET BY MOUTH EVERY MORNING  Dispense: 30 tablet; Refill: 0    If protocol passes may refill for 12 months if within 3 months of last provider visit (or a total of 15 months).

## 2021-06-05 NOTE — TELEPHONE ENCOUNTER
"\"A pyrex dish fell out of the cabinet above me and hit my forehead pretty hard on Sunday evening.\" No LOC. Since then she states she has had migraines, head hurts a lot, feeling foggy and not able to concentrate very well. Headache (forehead) has been constant, beginning Sunday evening. Today she is \"sensitive to light and feels icky.\" Currently headache pain =\"5\". She states her head feels warm (sensation). It is tender to touch where the dish hit her forehead. No swelling noted.  She took 2 Ibuprofen for the pain x1, \"it helped a little bit\". She just bought Excedrin Migraine to try. She states she feels out of it today, forgetting certain words, parts of a story, and when trying to have a conversation. Nauseated today, no vomiting. She has been eating and drinking.     Reason for Disposition    [1] After 72 hours AND [2] headache persists    Protocols used: HEAD INJURY-A-AH    Triaged to a disposition of See PCP within 3 days. Advised to hold ASA until seen, due to the risk of bleeding. Because she feels \"out of it, having difficulty concentrating, etc\" recommended she be seen tonight. Discussed options: UR and ER. She states she will go to the [facility] UR tonight.     Esha Lam RN Triage Nurse Advisor    "

## 2021-06-06 NOTE — PROGRESS NOTES
"OV    2/13/2020  Assessment:     1. Dysthymic disorder  buPROPion (WELLBUTRIN XL) 300 MG 24 hr tablet    sertraline (ZOLOFT) 50 MG tablet       Plan:      We reviewed the etiology and natural history of depression/anxiety and options for treatment. She would like to resume sertraline and buproprion as directed. We reviewed the potential side effects and indications for urgent evaluation. She will let me know if she has any significant problems or concerns. Should f/u in 4-6 mos, sooner if any concerns.         Subjective:             Mary Jo Aparicio is an 39 y.o. female who presents for follow up of depression and anxiety. Onset several years ago, and symptoms have waxed and waned but are worse overall recently since she has been off her medications for the last few weeks. Current symptoms include: depressed mood, anhedonia, fatigue, feelings of worthlessness/guilt and difficulty concentrating and feelings of losing control and racing thoughts.  Patient denies insomnia and recurrent thoughts of death and panic attacks. She denies current suicidal and homicidal ideation.        Current treatment for depression: Medication: sertraline and burproprion until a few wks ago. She complains of the following side effects from the treatment: none.          The following portions of the patient's history were reviewed and updated as appropriate: allergies, current medications, past family history, past medical history, past social history, past surgical history and problem list.    Review of Systems  A 12 point comprehensive review of systems was negative except as noted.         Objective:     Vitals:    02/13/20 1426   BP: 118/74   Patient Position: Sitting   Cuff Size: Adult Large   Pulse: (!) 104   SpO2: 98%   Weight: 216 lb 8 oz (98.2 kg)   Height: 5' 7\" (1.702 m)      Body mass index is 33.91 kg/m .  Physical Exam:  GEN: Alert and oriented, NAD,  well nourished  SKIN:  Normal skin turgor, no lesions/rashes   HEENT: moist " mucous membranes, no rhinorrhea.    NECK: Normal.  No adenopathy or thyromegaly.  CV: Regular rate and rhythm, no murmurs.   LUNGS: Clear to auscultation bilaterally.    ABDOMEN: Soft, non-tender, non-distended, no masses   EXTREMITY: No edema, cyanosis  NEURO: Grossly normal.     Mental Status Examination:  Dress, grooming, personal hygiene: Appropriate  Speech: Appropriate  Mood: Appropriate

## 2021-06-08 NOTE — PROGRESS NOTES
OB visit  2017    ASSESSMENT/PLAN:   Mary Jo Aparicio is a 36 y.o. female  at 24w6d who presents for prenatal check. Patient is doing well.  1. Supervision of normal pregnancy in second trimester  - Urinalysis Macroscopic  - Hemoglobin; Future  - Syphilis Screen, [place]; Future  - HML Antibody Screen; Future  - Glucose,Gestational Challenge (1 Hour); Future      We reviewed warning signs and indications for evaluation. She will follow up in 3 wks, and call with any questions or concerns. She will be due for GCT and Rhophylac next visit.     SUBJECTIVE:   She has no complaints.   Positive for: movement  Negative for: headaches, vision changes, edema, BH and PTL  Denies: discharge and bleeding    OBJECTIVE:   Visit Vitals     /82     Wt 202 lb 7 oz (91.8 kg)     LMP 08/10/2016     Breastfeeding No     BMI 30.78 kg/m2      Please see prenatal data for fundal height and fetal HR.   Abdomen:  gravid, soft and NT/ND     Pelvis:  Exam deferred.   FHT:  155 BPM   Uterine Size: size equals dates   Presentations: n/a       Results for orders placed or performed in visit on 17   Urinalysis Macroscopic   Result Value Ref Range    Color, UA Yellow Colorless, Yellow, Straw, Light Yellow    Clarity, UA Clear Clear    Glucose, UA Negative Negative    Bilirubin, UA Negative Negative    Ketones, UA Negative Negative    Specific Gravity, UA 1.020 1.002 - 1.030    Blood, UA Trace (!) Negative    pH, UA 7.0 4.5 - 8.0    Protein, UA Negative Negative mg/dL    Urobilinogen, UA 0.2 E.U./dL 0.2 E.U./dL, 1.0 E.U./dL    Nitrite, UA Negative Negative    Leukocytes, UA Trace (!) Negative

## 2021-06-08 NOTE — PROGRESS NOTES
OV   5/19/2020  Assessment:         1. Vaginal spotting     2. IUD check up           Plan:         We reviewed the potential etiologies for her vaginal symptoms and labs were sent as noted above. She was reassured that the IUD appears to be in adequate position. We reviewed indications for reevaluation, and she will call or return to clinic with any ongoing concerns.         Subjective:             Mary Jo Aparicio is a 39 y.o. female who presents for evaluation of an abnormal vaginal bleeding and low back pain recently. Symptoms have been present for a few days. She has an IUD in place and has not been able to feel the strings recently. She denies discharge, local irritation and vulvar itching. Associated symptoms: none. Denies anorexia, chills, constipation, dysuria, and fever.       Contraception: mirena IUD. Sexually transmitted infection risk: very low risk of STD exposure. Menstrual flow: rare.      The following portions of the patient's history were reviewed and updated as appropriate: allergies, current medications and problem list    Review of Systems  A 12 point comprehensive review of systems was negative except as noted.       Objective:         Vitals:    05/19/20 1448   BP: 124/82   Pulse: 76   SpO2: 99%   Weight: 201 lb 5 oz (91.3 kg)      Physical Exam:  General: Alert, cooperative, no distress  Head: Normocephalic, without obvious abnormality, atraumatic  Eyes:  conjunctiva/corneas clear, EOM's intact  Throat: Lips, mucosa, and tongue normal; teeth and gums normal  Neck: Supple, symmetrical, trachea midline, no  Lungs: Clear to auscultation bilaterally, respirations unlabored  Heart: Regular rate and rhythm,  no murmur, rub, or gallop,   Abdomen: Soft, non-tender, no masses, no organomegaly  Pelvic: external genitalia normal, vagina normal without discharge, cervix normal in appearance with visible IUD strings which appear to be of an appropriate length, no cervical motion tenderness    Extremities:  Extremities normal, atraumatic, no cyanosis or edema  Skin: Skin color, texture, turgor normal, no rashes or lesions  Neurologic: Normal

## 2021-06-08 NOTE — PROGRESS NOTES
OB visit  1/3/2017    ASSESSMENT/PLAN:   Mary Jo Aparicio is a 36 y.o. female  at 20w6d who presents for prenatal check. Patient is doing well.  1. Supervision of normal pregnancy in second trimester  - Urinalysis Macroscopic  - Urinalysis Macroscopic; Standing  - US OB > = 14 Weeks; Future      We reviewed warning signs and indications for evaluation. We will schedule a screening u/s soon. She will follow up in 4 wks, and call with any questions or concerns. She will be due for GCT the visit after next.      SUBJECTIVE:   She complains of uri sx last week, better now. No other concerns.  Positive for: movement  Negative for: headaches, vision changes, edema and abdominal pain  Denies: fluid leaking, discharge and spotting    OBJECTIVE:   Visit Vitals     /78     Wt 193 lb 4 oz (87.7 kg)     LMP 08/10/2016     Breastfeeding No     BMI 29.38 kg/m2      Please see prenatal data for fundal height and fetal HR.   Abdomen:  gravid, soft and NT/ND     Pelvis:  Exam deferred.   FHT:  50 BPM   Uterine Size: size equals dates   Presentations: unsure       Results for orders placed or performed in visit on 16   Urinalysis Macroscopic   Result Value Ref Range    Color, UA Yellow Colorless, Yellow, Straw, Light Yellow    Clarity, UA Clear Clear    Glucose, UA Negative Negative    Bilirubin, UA Negative Negative    Ketones, UA Negative Negative    Specific Gravity, UA >=1.030 1.002 - 1.030    Blood, UA Small (!) Negative    pH, UA 5.5 4.5 - 8.0    Protein, UA Negative Negative mg/dL    Urobilinogen, UA 0.2 E.U./dL 0.2 E.U./dL, 1.0 E.U./dL    Nitrite, UA Negative Negative    Leukocytes, UA Negative Negative

## 2021-06-08 NOTE — TELEPHONE ENCOUNTER
"Pt reports \"bad lower back pain.\"  \"Like pains during labor.\"  Onset 4 days ago.  No worsening daily.  \"Just bad every day since May 16th).    \"Pain constant.\"  \"Moderate to severe.\"  Interferes with sleep and ADLs.    Additional symptoms:  \"Bright red spotting\" -> also onset 4 days ago.  Intermittent.  Scant.  Has IUD.  \"Cannot feel the string.\"    \"Has been lying down most of every day the past 4 days.\"  No change in pain today.  \"Still painful to sit, painful to stand up straight.\"  However pt states \"I have 4 kids, so I'm still taking care of them.\"    Still able to urinate and have bowel movements.  Able to eat/drink.    NO suspicion of covid symptoms.  No fevers.  No chills.  No coughs.  No chest pain or breathing symptoms.    Pt's symptoms warrant in-person clinical eval.  QUESTIONS:  Can pt come to clinic?  -> Or, does she need ED?    Pt will await a callback -> [phone].    Kristin Osullivan RN  [facility] Triage     Reason for Disposition    Caller has URGENT question and triager unable to answer question    Protocols used: CONTRACEPTION - IUD SYMPTOMS AND [id number]      "

## 2021-06-09 NOTE — PROGRESS NOTES
OB visit  2017    ASSESSMENT/PLAN:   Mary Jo Aparicio is a 36 y.o. female  at 34w2d who presents for prenatal check. Patient is doing well.  1. Supervision of normal pregnancy in second trimester  - Urinalysis Macroscopic  - Group B Strep Screen by PCR    2. Nausea  - ALT (SGPT)  - AST (SGOT)  - Protein/Creatinine Ratio, Urine Random  - HM2(CBC w/o Differential)  - Creatinine  - Uric Acid      We reviewed warning signs and indications for evaluation. We discussed potential etiologies for her nausea and other symptoms and we will send labs as noted. We reviewed rest, fluids, and when to go in etc. She will follow up in 2 wks, and call with any questions or concerns.       SUBJECTIVE:   She complains of nausea, vomiting x1, headaches for last 3 days.   Positive for: nausea, headaches, edema and movement  Negative for: vision changes, abdominal pain, cramping and PTL  Denies: fluid leaking, discharge and spotting    OBJECTIVE:   /78  Wt 208 lb 6 oz (94.5 kg)  LMP 08/10/2016  Breastfeeding? No  BMI 31.68 kg/m2   Please see prenatal data for fundal height and fetal HR.   Abdomen:  gravid, soft and NT/ND     Pelvis:  External genitalia: normal general appearance   FHT:  150 BPM   Uterine Size: size equals dates   Presentations: unsure       Cervix:     deferred       Results for orders placed or performed in visit on 17   Urinalysis Macroscopic   Result Value Ref Range    Color, UA Yellow Colorless, Yellow, Straw, Light Yellow    Clarity, UA Clear Clear    Glucose, UA Negative Negative    Bilirubin, UA Negative Negative    Ketones, UA Negative Negative    Specific Gravity, UA <=1.005 1.005 - 1.030    Blood, UA Negative Negative    pH, UA 5.5 5.0 - 8.0    Protein, UA Negative Negative mg/dL    Urobilinogen, UA 0.2 E.U./dL 0.2 E.U./dL, 1.0 E.U./dL    Nitrite, UA Negative Negative    Leukocytes, UA Negative Negative

## 2021-06-09 NOTE — PROGRESS NOTES
OB visit  2017    ASSESSMENT/PLAN:   Mary Jo Aparicio is a 36 y.o. female  at 27w6d who presents for prenatal check. Patient is doing well.  1. Supervision of normal pregnancy in second trimester  - Urinalysis Macroscopic  - US OB > = 14 Weeks; Future  - rho(D) immune globulin injection 300 mcg (RHOPHYLAC); Inject 2 mL (300 mcg total) into the shoulder, thigh, or buttocks once.  - Hemoglobin  - Syphilis Screen, [facility]  - HML Antibody Screen  - Glucose,Gestational Challenge (1 Hour)    2. Rh negative status during pregnancy, third trimester  - rho(D) immune globulin injection 300 mcg (RHOPHYLAC); Inject 2 mL (300 mcg total) into the shoulder, thigh, or buttocks once.      We reviewed warning signs and indications for evaluation. See orders above. She will follow up in 3 wks, and call with any questions or concerns.     SUBJECTIVE:   She complains of pelvic pressure. 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 Concerns include none.  Positive for: movement  Negative for: headaches, vision changes, edema, BH and PTL  Denies: fluid leaking, discharge and spotting    OBJECTIVE:   Visit Vitals     /66     Wt 202 lb 7 oz (91.8 kg)     LMP 08/10/2016     Breastfeeding No     BMI 30.78 kg/m2      Please see prenatal data for fundal height and fetal HR.   Abdomen:  gravid, soft and NT/ND     Pelvis:  Exam deferred.   FHT:  150 BPM   Uterine Size: size equals dates   Presentations: unsure               Results for orders placed or performed in visit on 17   Urinalysis Macroscopic   Result Value Ref Range    Color, UA Yellow Colorless, Yellow, Straw, Light Yellow    Clarity, UA Clear Clear    Glucose, UA Negative Negative    Bilirubin, UA Small (!) Negative    Ketones, UA Trace (!) Negative    Specific Gravity, UA 1.025 1.002 - 1.030    Blood, UA Trace (!) Negative    pH, UA 6.0 4.5 - 8.0    Protein, UA 30 mg/dL (!) Negative mg/dL    Urobilinogen, UA 2.0 E.U./dL (!) 0.2 E.U./dL, 1.0 E.U./dL    Nitrite, UA  Negative Negative    Leukocytes, UA Small (!) Negative

## 2021-06-09 NOTE — PROGRESS NOTES
OB visit  3/21/2017    ASSESSMENT/PLAN:   Mary Jo Aparicio is a 36 y.o. female  at 31w6d who presents for prenatal check. Patient is doing well.  1. Supervision of normal pregnancy in second trimester  - Urinalysis Macroscopic      We reviewed warning signs and indications for evaluation. She will follow up in 2 wks, and call with any questions or concerns.       SUBJECTIVE:   She has no complaints. Ran out of prozac. More irritable.   Positive for: movement  Negative for: headaches, vision changes, edema, cramping and PTL  Denies: fluid leaking, discharge and spotting    OBJECTIVE:   Visit Vitals     /62     Wt 205 lb 1 oz (93 kg)     LMP 08/10/2016     Breastfeeding No     BMI 31.18 kg/m2      Please see prenatal data for fundal height and fetal HR.   Abdomen:  gravid, soft and NT/ND     Pelvis:  Exam deferred.   FHT:  155 BPM   Uterine Size: size equals dates   Presentations: unsure       Cervix:     deferred       Results for orders placed or performed in visit on 17   Urinalysis Macroscopic   Result Value Ref Range    Color, UA Yellow Colorless, Yellow, Straw, Light Yellow    Clarity, UA Clear Clear    Glucose, UA Negative Negative    Bilirubin, UA Negative Negative    Ketones, UA Negative Negative    Specific Gravity, UA >=1.030 1.005 - 1.030    Blood, UA Trace (!) Negative    pH, UA 5.5 5.0 - 8.0    Protein, UA Negative Negative mg/dL    Urobilinogen, UA 1.0 E.U./dL 0.2 E.U./dL, 1.0 E.U./dL    Nitrite, UA Negative Negative    Leukocytes, UA Small (!) Negative

## 2021-06-10 NOTE — PROGRESS NOTES
OB visit  2017    ASSESSMENT/PLAN:   Mary Jo Aparicio is a 36 y.o. female  at 36w5d who presents for prenatal check. Patient is doing well.  1. Supervision of normal pregnancy in second trimester  - Urinalysis Macroscopic      We reviewed warning signs and indications for evaluation. She will follow up in 1 wks, and call with any questions or concerns.     SUBJECTIVE:   She complains of BH, some ctx last week. Concerns include BH.  Positive for: mild edema, movement, intermittent cramping/BH  Negative for: headaches, vision changes and abdominal pain  Denies: fluid leaking, discharge and spotting    OBJECTIVE:   /70  Wt 208 lb 4 oz (94.5 kg)  LMP 08/10/2016  Breastfeeding? No  BMI 31.66 kg/m2   Please see prenatal data for fundal height and fetal HR.   Abdomen:  gravid, soft and NT/ND     Pelvis:  External genitalia: normal general appearance   FHT:  145 BPM   Uterine Size: size equals dates   Presentations: cephalic       Cervix:    Dilation: Closed   Effacement: 50%   Station:  -2   Consistency: soft   Position: posterior       Results for orders placed or performed in visit on 17   Urinalysis Macroscopic   Result Value Ref Range    Color, UA Yellow Colorless, Yellow, Straw, Light Yellow    Clarity, UA Clear Clear    Glucose, UA Negative Negative    Bilirubin, UA Negative Negative    Ketones, UA Negative Negative    Specific Gravity, UA 1.025 1.005 - 1.030    Blood, UA Negative Negative    pH, UA 5.5 5.0 - 8.0    Protein, UA Negative Negative mg/dL    Urobilinogen, UA 1.0 E.U./dL 0.2 E.U./dL, 1.0 E.U./dL    Nitrite, UA Negative Negative    Leukocytes, UA Small (!) Negative

## 2021-06-10 NOTE — TELEPHONE ENCOUNTER
Refill Approved    Rx renewed per Medication Renewal Policy. Medication was last renewed on 2/13/20, last OV 5/19/20.    Herlinda Cole, [facility] Triage/Med Refill 8/10/2020     Requested Prescriptions   Pending Prescriptions Disp Refills     sertraline (ZOLOFT) 50 MG tablet [Pharmacy Med Name: SERTRALINE 50MG TABLETS] 90 tablet 1     Sig: TAKE 1 TABLET(50 MG) BY MOUTH DAILY       SS[facility] Refill Protocol  Passed - 8/8/2020  9:56 AM        Passed - PCP or prescribing provider visit in last year     Last office visit with prescriber/PCP: 5/19/2020 Nayeli Webb MD OR same dept: 5/19/2020 Nayeli Webb MD OR same specialty: 5/19/2020 Nayeli Webb MD  Last physical: Visit date not found Last MTM visit: Visit date not found   Next visit within 3 mo: Visit date not found  Next physical within 3 mo: Visit date not found  Prescriber OR PCP: Nayeli Webb MD  Last diagnosis associated with med order: 1. Dysthymic disorder  - sertraline (ZOLOFT) 50 MG tablet [Pharmacy Med Name: SERTRALINE 50MG TABLETS]; TAKE 1 TABLET(50 MG) BY MOUTH DAILY  Dispense: 90 tablet; Refill: 1  - buPROPion (WELLBUTRIN XL) 300 MG 24 hr tablet [Pharmacy Med Name: BUPROPION XL 300MG TABLETS]; TAKE 1 TABLET(300 MG) BY MOUTH EVERY MORNING  Dispense: 90 tablet; Refill: 1    If protocol passes may refill for 12 months if within 3 months of last provider visit (or a total of 15 months).                buPROPion (WELLBUTRIN XL) 300 MG 24 hr tablet [Pharmacy Med Name: BUPROPION XL 300MG TABLETS] 90 tablet 1     Sig: TAKE 1 TABLET(300 MG) BY MOUTH EVERY MORNING       Tricyclics/Misc Antidepressant/Antianxiety Meds Refill Protocol Passed - 8/8/2020  9:56 AM        Passed - PCP or prescribing provider visit in last year     Last office visit with prescriber/PCP: 5/19/2020 Nayeli Webb MD OR same dept: 5/19/2020 Nayeli Webb MD OR same specialty: 5/19/2020 Nayeli Webb MD  Last  physical: Visit date not found Last MTM visit: Visit date not found   Next visit within 3 mo: Visit date not found  Next physical within 3 mo: Visit date not found  Prescriber OR PCP: Nayeli Webb MD  Last diagnosis associated with med order: 1. Dysthymic disorder  - sertraline (ZOLOFT) 50 MG tablet [Pharmacy Med Name: SERTRALINE 50MG TABLETS]; TAKE 1 TABLET(50 MG) BY MOUTH DAILY  Dispense: 90 tablet; Refill: 1  - buPROPion (WELLBUTRIN XL) 300 MG 24 hr tablet [Pharmacy Med Name: BUPROPION XL 300MG TABLETS]; TAKE 1 TABLET(300 MG) BY MOUTH EVERY MORNING  Dispense: 90 tablet; Refill: 1    If protocol passes may refill for 12 months if within 3 months of last provider visit (or a total of 15 months).

## 2021-06-10 NOTE — ANESTHESIA PREPROCEDURE EVALUATION
Anesthesia Evaluation      Patient summary reviewed   No history of anesthetic complications     Airway   Mallampati: II  Neck ROM: full   Pulmonary - normal exam    breath sounds clear to auscultation  (+) asthma  mild,  well controlled,   (-) recent URI                         Cardiovascular - normal exam  Exercise tolerance: > or = 4 METS  (-) hypertension, angina  Rhythm: regular  Rate: normal,         Neuro/Psych    (+) chronic pain (migraines )  (-) no seizures, no neuromuscular disease, no CVA    Endo/Other    (+) pregnant  (-) no diabetes     GI/Hepatic/Renal            Dental - normal exam                        Anesthesia Plan  Planned anesthetic: epidural    ASA 2     Anesthetic plan and risks discussed with: patient    Post-op plan: routine recovery

## 2021-06-10 NOTE — ANESTHESIA POSTPROCEDURE EVALUATION
Patient: Mary Jo Aparicio  * No procedures listed *  Anesthesia type: regional    Patient location: Labor and Delivery  Last vitals:   Vitals:    05/15/17 1903   BP: 110/56   Pulse: (!) 102   Resp:    Temp:    SpO2:      Post vital signs: stable  Level of consciousness: awake, alert and oriented  Post-anesthesia pain: pain controlled  Post-anesthesia nausea and vomiting: no  Pulmonary: unassisted, return to baseline  Cardiovascular: stable and blood pressure at baseline  Hydration: adequate  Anesthetic events: no    QCDR Measures:  ASA# 11 - Lien-op Cardiac Arrest: ASA11B - Patient did NOT experience unanticipated cardiac arrest  ASA# 12 - Lien-op Mortality Rate: ASA12B - Patient did NOT die  ASA# 13 - PACU Re-Intubation Rate: NA - No ETT / LMA used for case  ASA# 10 - Composite Anes Safety: ASA10A - No serious adverse event  ASA# 38 - New Corneal Injury: ASA38A - No new exposure keratitis or corneal abrasion in PACU    Additional Notes:

## 2021-06-10 NOTE — PROGRESS NOTES
OB visit  2017    ASSESSMENT/PLAN:   Mary Jo Aparicio is a 36 y.o. female  at 37w5d who presents for prenatal check. Patient is doing well.  1. Encounter for supervision of other normal pregnancy in third trimester  - Urinalysis Macroscopic    2. Grand multiparity      We reviewed warning signs and indications for evaluation. She will follow up weekly, and call with any questions or concerns.       SUBJECTIVE:   She complains of sharp vaginal pains intermittently. Some increased discharge, but no itching/burning. Baby seems lower.  Positive for: edema and movement  Negative for: headaches, vision changes, abdominal pain and cramping  Denies: fluid leaking, discharge and spotting    OBJECTIVE:   /74  Wt 213 lb (96.6 kg)  LMP 08/10/2016  BMI 32.39 kg/m2   Please see prenatal data for fundal height and fetal HR.   Abdomen:  gravid, soft and NT/ND     Pelvis:  External genitalia: normal general appearance   FHT:  140 BPM   Uterine Size: size equals dates   Presentations: cephalic       Cervix:    Dilation: 1cm   Effacement: 30%   Station:  -2   Consistency: medium   Position: middle       Results for orders placed or performed in visit on 17   Urinalysis Macroscopic   Result Value Ref Range    Color, UA Yellow Colorless, Yellow, Straw, Light Yellow    Clarity, UA Slightly Cloudy (!) Clear    Glucose, UA Negative Negative    Bilirubin, UA Negative Negative    Ketones, UA Negative Negative    Specific Gravity, UA 1.025 1.005 - 1.030    Blood, UA Negative Negative    pH, UA 5.5 5.0 - 8.0    Protein, UA Negative Negative mg/dL    Urobilinogen, UA 0.2 E.U./dL 0.2 E.U./dL, 1.0 E.U./dL    Nitrite, UA Negative Negative    Leukocytes, UA Trace (!) Negative

## 2021-06-10 NOTE — ANESTHESIA PROCEDURE NOTES
Epidural Block    Patient location during procedure: OB  Time Called: 5/15/2017 1:20 PM  Reason for Block:labor epidural  Staffing:  Performing  Anesthesiologist: RAY RAPHAEL  Preanesthetic Checklist  Completed: patient identified, risks, benefits, and alternatives discussed, timeout performed, consent obtained, at patient's request, airway assessed, oxygen available, suction available, emergency drugs available and hand hygiene performed  Procedure  Patient position: sitting  Prep: Betadine, site prepped and draped and etoh  Patient monitoring: continuous pulse oximetry, heart rate and blood pressure  Approach: midline  Location: L3-L4  Injection technique: PRAFUL saline  Number of Attempts:1  Needle  Needle type: Sling   Needle gauge: 18 G     Catheter in Space: 5  Assessment  Sensory level: T10  No complications      Additional Notes:  3cc 1% lidocaine skin, negative aspiration, negative test dose of 4cc 1.5% PF lidocaine with epi 1/200

## 2021-06-10 NOTE — PROGRESS NOTES
OB visit  2017    ASSESSMENT/PLAN:   Mary Jo Aparicio is a 36 y.o. female  at 38w5d who presents for prenatal check. Patient is doing well.  1. Supervision of normal pregnancy in second trimester  - Urinalysis Macroscopic      We reviewed warning signs and indications for evaluation. She will follow up in 1 wk, and call with any questions or concerns.       SUBJECTIVE:   She complains of some ctx overnight, but nothing severe. More mucous discharge this weekend. Concerns include increased swelling late last week - [place] zoo. Some allergy symptoms recently.   Positive for: edema, movement, cramping and BH. Some mucous discharge.  Negative for: headaches, vision changes and abdominal pain  Denies: fluid leaking, bleeding and spotting    OBJECTIVE:   /60  Wt 213 lb 1 oz (96.6 kg)  LMP 08/10/2016  Breastfeeding? No  BMI 32.4 kg/m2   Please see prenatal data for fundal height and fetal HR.   Abdomen:  gravid, soft and NT/ND     Pelvis:  External genitalia: normal general appearance   FHT:  142 BPM   Uterine Size: size equals dates   Presentations: cephalic       Cervix:    Dilation: 2cm   Effacement: 50%   Station:  -2   Consistency: medium   Position: posterior       Results for orders placed or performed in visit on 17   Urinalysis Macroscopic   Result Value Ref Range    Color, UA Yellow Colorless, Yellow, Straw, Light Yellow    Clarity, UA Slightly Cloudy (!) Clear    Glucose, UA Negative Negative    Bilirubin, UA Negative Negative    Ketones, UA Negative Negative    Specific Gravity, UA 1.020 1.005 - 1.030    Blood, UA Negative Negative    pH, UA 5.5 5.0 - 8.0    Protein, UA Negative Negative mg/dL    Urobilinogen, UA 0.2 E.U./dL 0.2 E.U./dL, 1.0 E.U./dL    Nitrite, UA Negative Negative    Leukocytes, UA Moderate (!) Negative

## 2021-06-10 NOTE — PROGRESS NOTES
"OB visit  2017    ASSESSMENT/PLAN:   Mary Jo Aparicio is a 36 y.o. female  at 35w5d who presents for prenatal check. Patient is doing well.  1. Supervision of normal pregnancy in second trimester  - Urinalysis Macroscopic      We reviewed warning signs and indications for evaluation, such as headaches, vision changes, and increased edema, etc. Recommended taking it easy at home.  She will follow up in 1 wks, and call with any questions or concerns.     SUBJECTIVE:   She complains of some dancing lights at times, no sig headaches. Has been doing a lot of cleaning at home, \"nesting\". Concerns include nervous about IV in labor. Had multiple attempts last time. Considering contraceptive options.  Migraines on OCPs. Not interested in PPTL.  Positive for: vision changes, edema, movement, cramping and BH  Negative for: headaches  Denies: fluid leaking, discharge and spotting    OBJECTIVE:   /80  Wt 210 lb 3 oz (95.3 kg)  LMP 08/10/2016  Breastfeeding? No  BMI 31.96 kg/m2   Please see prenatal data for fundal height and fetal HR.   Abdomen:  gravid, soft and NT/ND     Pelvis:  External genitalia: normal general appearance   FHT:  145 BPM   Uterine Size: size equals dates   Presentations: cephalic       Cervix:    Dilation: Closed   Effacement: 30%   Station:  -2   Consistency: medium   Position: posterior       Results for orders placed or performed in visit on 17   Urinalysis Macroscopic   Result Value Ref Range    Color, UA Yellow Colorless, Yellow, Straw, Light Yellow    Clarity, UA Slightly Cloudy (!) Clear    Glucose, UA Negative Negative    Bilirubin, UA Negative Negative    Ketones, UA Negative Negative    Specific Gravity, UA <=1.005 1.005 - 1.030    Blood, UA Negative Negative    pH, UA 5.5 5.0 - 8.0    Protein, UA Negative Negative mg/dL    Urobilinogen, UA 0.2 E.U./dL 0.2 E.U./dL, 1.0 E.U./dL    Nitrite, UA Negative Negative    Leukocytes, UA Small (!) Negative                      "

## 2021-06-11 NOTE — PROGRESS NOTES
Assessment:     1. Postpartum examination following vaginal delivery  medroxyPROGESTERone injection 150 mg (DEPO-PROVERA)   2. Anxiety     3. Dysthymic disorder     4. Encounter for other general counseling or advice on contraception  Pregnancy (Beta-hCG, Qual), Urine    medroxyPROGESTERone injection 150 mg (DEPO-PROVERA)   5. Vaginal discharge  Wet Prep, Vaginal       Plan:       We reviewed the importance of balanced diet, regular exercise, and adequate hydration. We discussed adequate calcium and vitamin D intake as well, and folate and DHA prior to attempting another pregnancy. She should follow up for her next pap in 2-3 years. We reviewed contraceptive options at length and she will use: Depo-Provera injections for now, and is considering Mirena or Paragard IUD.  She will call or return to clinic with any additional concerns. She will follow up in 2-3 mos for recheck on her mood symptoms, sooner if worsening symptoms.         Subjective:         Mary Jo Aparicio is a 36 y.o. female who presents for a postpartum visit. She is 6 weeks postpartum following a spontaneous vaginal delivery. I have fully reviewed the prenatal and intrapartum course. The delivery was at 40 gestational weeks. Outcome: spontaneous vaginal delivery. Postpartum course has been normal. Baby's course has been good, bit she does have craniosynostosis and has seen craniofacial at [facility]. Baby is feeding by breast. Bled for  4.5 weeks postpartum.  Currently bleeding  no bleeding. Bowel function is normal. Bladder function is normal. Patient has not resumed intercourse. Contraception method is none - interested in mirena IUD or depo provera. Plans for future pregnancies: none, but not ready for permanent contraception.        Patient presents for follow up of depression, and symptoms have been a bit worse since delivery. She complains of anxiety, anhedonia, depressed mood and fatigue. Patient denies feelings of worthlessness/guilt, hopelessness,  "recurrent thoughts of death and suicidal thoughts with specific plan and denies panic attacks.      Current treatment for depression:  none. She was on fluoxetine during her pregnancy. She complains of the following side effects from the treatment: none. Risk factors:  baby, toddler acting out. 4 other kids home for the summer.     The following portions of the patient's history were reviewed and updated as appropriate: allergies, current medications and problem list    Review of Systems  Pertinent items are noted in HPI.  Bleeding lasted about a month     Objective:       /78  Pulse 70  Ht 5' 8\" (1.727 m)  Wt 205 lb 1.6 oz (93 kg)  LMP 08/10/2016  BMI 31.19 kg/m2  Physical Exam:  General: Alert, cooperative, no distress  Head: Normocephalic, without obvious abnormality, atraumatic  Eyes: PERRL, conjunctiva/corneas clear, EOM's intact  Ears: Normal TM's and external ear canals, both ears  Nose: Nares normal, septum midline,mucosa normal, no drainage  Throat: Lips, mucosa, and tongue normal; teeth and gums normal  Neck: Supple, symmetrical, trachea midline, no adenopathy;  thyroid normal  Lungs: Clear to auscultation bilaterally, respirations unlabored  Breasts: exam deferred  Heart : Regular rate and rhythm, S1 and S2 normal, no murmur, rub, or gallop  Abdomen: Soft, non-tender, no masses, no organomegaly  Pelvic: external genitalia normal,  vagina normal without discharge, cervix normal in appearance,   no adnexal masses or tenderness, no cervical motion tenderness, uterus normal size and consistency    Extremities: Extremities normal, atraumatic, no cyanosis or edema  Skin: Skin color, texture, turgor normal, no rashes or lesions  Neurologic: Normal       "

## 2021-06-16 NOTE — PROGRESS NOTES
"Assessment:       1. Contraceptive management  - Pregnancy (Beta-hCG, Qual), Urine  2. Dysthymic disorder       Plan:       We reviewed her options for contraception/cycle control at length, and she is interested in proceeding with IUD , probably Mirena. We reviewed potential side effects at length, including mood changes, DVT, and hypertension, and she will call or return to clinic with any questions or concerns. UPT was done today and was negative. She will schedule the procedure in the next 1-2 weeks and refrain from intercourse in the meantime.         Subjective:         Mary Jo Aparicio is a 37 y.o. female who presents for counseling regarding contraception/cycle control. The patient is sexually active. Patient denies menorrhagia and pelvic pain, but has not had many menstrual cycles in the last several years due to pregnancies. She is not quite ready to consider a tubal ligation and her  will not get a vasectomy. Pertinent past medical history: migraines. She got depo after her last pregnancy but did not return for her follow up shot in December.     Menstrual History:  OB History      Para Term  AB Living    8 6 6 0 2 6    SAB TAB Ectopic Multiple Live Births    2 0 0 0 6         The following portions of the patient's history were reviewed and updated as appropriate: allergies, current medications, past family history, past medical history, past social history, past surgical history and problem list.    Review of Systems  Pertinent items are noted in HPI.     Objective:          Vitals:    18 1602   BP: 115/78   Pulse: 81   SpO2: 98%   Weight: 206 lb (93.4 kg)   Height: 5' 7\" (1.702 m)     GEN: Alert and oriented, NAD, well nourished  SKIN:  Normal skin turgor, no lesions/rashes   HEENT: NC/AT, moist mucous membranes, no rhinorrhea.    NECK: Normal.  No adenopathy or thyromegaly.  CV: Regular rate and rhythm, no murmurs.   LUNGS: Clear to auscultation bilaterally.    ABDOMEN: Soft, " non-tender, non-distended, no masses   BACK: Normal  EXTREMITY: No edema, cyanosis  NEURO: Grossly normal.

## 2021-06-16 NOTE — PROGRESS NOTES
IUD Insertion Procedure Note    Procedure: Placement of Mirena Intrauterine Device.    Pre-operative Diagnosis: Desire for long-term contraception    Post-operative Diagnosis: same    Indications: Contraception    Procedure Details:   Urine pregnancy test was done 2/9 and today and results were both negative. The risks (including infection, bleeding, pain, and uterine perforation) and benefits of the procedure were explained to the patient and written consent was obtained and scanned into the record.     Cervix cleansed with Betadine. Uterus sounded to 8.5 cm. The insertion tube was passed easily through the os and the arms were released. The tube was then passed to the level of the flange and the IUD was released without difficulty. The  was then removed. Strings were visible and trimmed to 2-3 cm. Patient tolerated the procedure well.    IUD Information:  Mirena, Lot # [id number], Expiration date 8/20.    Condition:  Stable    Complications:  None    Plan:  The patient was advised to call for any fever or for prolonged or severe pain or bleeding. She was advised to use OTC analgesics as needed for mild to moderate pain.   Patient counseled regarding techniques for self-monitoring of IUD and given precautions. Patient notified of removal date.  Patient will follow-up in 4-6 weeks as needed for string check.

## 2021-06-17 NOTE — TELEPHONE ENCOUNTER
Refill Approved    Rx renewed per Medication Renewal Policy. Medication was last renewed on 8/10/20.    Nick Eldridge, [facility] Triage/Med Refill 5/11/2021     Requested Prescriptions   Pending Prescriptions Disp Refills     sertraline (ZOLOFT) 50 MG tablet 90 tablet 2     Sig: Take 1 tablet (50 mg total) by mouth daily.       SSRI Refill Protocol  Passed - 5/10/2021  3:05 PM        Passed - PCP or prescribing provider visit in last year     Last office visit with prescriber/PCP: 5/19/2020 Nayeil Webb MD OR same dept: 5/19/2020 Nayeli Webb MD OR same specialty: 5/19/2020 Nayeli Webb MD  Last physical: Visit date not found Last MTM visit: Visit date not found   Next visit within 3 mo: Visit date not found  Next physical within 3 mo: Visit date not found  Prescriber OR PCP: Nayeli Webb MD  Last diagnosis associated with med order: 1. Dysthymic disorder  - sertraline (ZOLOFT) 50 MG tablet; Take 1 tablet (50 mg total) by mouth daily.  Dispense: 90 tablet; Refill: 2    If protocol passes may refill for 12 months if within 3 months of last provider visit (or a total of 15 months).

## 2021-06-18 NOTE — PROGRESS NOTES
"Assessment:       1. Incomplete uterovaginal prolapse  Ambulatory referral to Obstetrics / Gynecology   2. IUD check up  US Pelvis With Transvaginal Non OB          Plan:        . We reviewed the likely/potential etiology(ies) for her vaginal prloapse symptoms and we will proceed with a pelvic us to check IUD position and evaluate for other pathology. We discussed indications for intervention with prolapse and we will refer to [facility] for further evaluation and treatment. The IUD seems to be in good position still despite the prolapse, but we will see what the u/s shows. She will otherwise will f/u in  4-6 mos for routine med check/evaluation.      Subjective:         Mary Jo Aparicio is a 37 y.o. female who presents for evaluation of recent vaginal symptoms. She does have a Mirena IUD in place, and fears that it may be out of place. She reports feeling pressure and tissue coming down in her vaginal area, but nothing coming out completely. The patient is sexually active. Pertinent past medical history: She has 6 kids with her last delivery just over a year ago.    Menstrual History:  OB History      Para Term  AB Living    8 6 6 0 2 6    SAB TAB Ectopic Multiple Live Births    2 0 0 0 6         The following portions of the patient's history were reviewed and updated as appropriate: allergies, current medications, past family history, past medical history, past social history, past surgical history and problem list.    Review of Systems  Pertinent items are noted in HPI.     Objective:          Vitals:    06/15/18 1304   BP: 118/72   Pulse: 82   Weight: 208 lb (94.3 kg)   Height: 5' 7\" (1.702 m)      General: alert, pleasant, and no distress  Head: Normocephalic, without obvious abnormality, atraumatic  Eyes: conjunctivae and sclerae normal and pupils equal, round, reactive to   light and accomodation  Ears: normal TM's and external ear canals both ears  Nose: no discharge, no sinus tenderness  Throat: " lips, mucosa, and tongue normal; teeth and gums normal  Neck: no adenopathy, supple, symmetrical, trachea midline and thyroid normal  Back: symmetric, ROM normal. No CVA tenderness.  Lungs: clear to auscultation bilaterally  Breasts: exam deferred.  Heart : regular rate and rhythm and no murmurs  Abdomen:  bowel sounds normal, no masses palpable and soft, non-tender  Pelvic: external genitalia normal,  vagina normal without discharge, cervix normal in appearance with visible IUD strings,   no adnexal masses or tenderness, no cervical motion tenderness, moderate prolapse of uterus noted.  Extremities: extremities normal, atraumatic, no cyanosis or edema  Pulses: 2+ and symmetric  Skin: Skin color, texture, turgor normal. No rashes or lesions  Lymph nodes: Cervical, supraclavicular, and axillary nodes normal.  Neurologic: Grossly normal

## 2021-06-18 NOTE — PROGRESS NOTES
"Assessment:      1. Acute tonsillitis  Rapid Strep A Screen-Throat   2. Sore throat  Rapid Strep A Screen-Throat   3. Cough            Plan:        Rapid strep is negative and culture was plated. We reviewed the likely etiology for her sore throat symptoms and will cover for tonsillitis with amoxicillin today. We reviewed use of OTC analgesics as well as increased fluids and rest, and she will call or return to clinic with any ongoing or worsening symptoms.        Subjective:         Mary Jo Aparicio is a 37 y.o. female who presents for evaluation of sore throat. Associated symptoms include chest congestion, pain while swallowing, productive cough and sore throat. Onset of symptoms was a few days ago, and have been gradually worsening since that time. She denies chills, fever, hemoptysis, shortness of breath and wheezing.    She is drinking plenty of fluids. She has not had a recent close exposure to someone with proven streptococcal pharyngitis.    Review of Systems  Pertinent items are noted in HPI.        Objective:        /74  Pulse 80  Ht 5' 7\" (1.702 m)  Wt 209 lb 3 oz (94.9 kg)  Breastfeeding? Yes  BMI 32.76 kg/m2  General     Alert, cooperative, no distress   Head:    Normocephalic, without obvious abnormality, atraumatic   Eyes:    PERRL, conjunctiva/corneas clear, EOM's intact   Ears:    TM's dull, mildly erythematous bilat ears. Normal external ear canals, both ears   Nose:   Nares normal, mucosa normal, no drainage   or sinus tenderness   Throat:    exudates present and moderate oropharyngeal erythema    Neck:   Supple, moderate anterior cervical adenopathy and supple, symmetrical, trachea midline    Lungs:     clear to auscultation bilaterally   Heart :    Regular rate and rhythm, no murmur, rub or gallop   Abdomen:     Soft, non-tender, no masses   Skin:   Skin color, texture, turgor normal, no rashes or lesions            Laboratory  Results for orders placed or performed in visit on 02/15/18 "   Pregnancy, Urine   Result Value Ref Range    Pregnancy Test, Urine Negative Negative    Specific Gravity, UA 1.025 1.001 - 1.030

## 2021-06-19 NOTE — LETTER
Letter by Nayeli Webb MD at      Author: Nayeli Webb MD Service: -- Author Type: --    Filed:  Encounter Date: 10/14/2019 Status: Signed         Mary Jo Aparicio  [address]  [place] [address]      October 18, 2019      Dear Mary Jo,    As a valued [facility] patient, your healthcare needs are our priority.  Your health care team has determined that you are due for an appointment regarding your medications. .    To help prevent delays in your care, please call the [facility] at [phone]    .    We look forward to partnering with you to achieve optimal health and wellbeing.    Sincerely,  Your care team at [facility]

## 2021-06-24 NOTE — TELEPHONE ENCOUNTER
Refill Approved    Rx renewed per Medication Renewal Policy. Medication was last renewed on 4/1/18.    Anh Purvis, [facility] Triage/Med Refill 2/22/2019     Requested Prescriptions   Pending Prescriptions Disp Refills     buPROPion (WELLBUTRIN XL) 150 MG 24 hr tablet [Pharmacy Med Name: BUPROPION XL 150MG TABLETS (24 H)] 30 tablet 0     Sig: TAKE 1 TABLET BY MOUTH EVERY MORNING    Tricyclics/Misc Antidepressant/Antianxiety Meds Refill Protocol Passed - 2/21/2019  3:27 AM       Passed - PCP or prescribing provider visit in last year    Last office visit with prescriber/PCP: 6/15/2018 Nayeli Webb MD OR same dept: 6/15/2018 Nayeli Webb MD OR same specialty: 6/15/2018 Nayeli Webb MD  Last physical: Visit date not found Last MTM visit: Visit date not found   Next visit within 3 mo: Visit date not found  Next physical within 3 mo: Visit date not found  Prescriber OR PCP: Nayeli Webb MD  Last diagnosis associated with med order: 1. Dysthymic disorder  - buPROPion (WELLBUTRIN XL) 150 MG 24 hr tablet [Pharmacy Med Name: BUPROPION XL 150MG TABLETS (24 H)]; TAKE 1 TABLET BY MOUTH EVERY MORNING  Dispense: 30 tablet; Refill: 0    If protocol passes may refill for 12 months if within 3 months of last provider visit (or a total of 15 months).

## 2021-06-26 ENCOUNTER — HEALTH MAINTENANCE LETTER (OUTPATIENT)
Age: 41
End: 2021-06-26

## 2021-07-14 PROBLEM — Z34.90 PREGNANT: Status: RESOLVED | Noted: 2017-05-15 | Resolved: 2018-02-15

## 2021-07-14 PROBLEM — Z34.83 ENCOUNTER FOR SUPERVISION OF OTHER NORMAL PREGNANCY IN THIRD TRIMESTER: Status: RESOLVED | Noted: 2017-05-01 | Resolved: 2017-05-15

## 2021-07-14 PROBLEM — Z64.1 GRAND MULTIPARITY: Status: RESOLVED | Noted: 2017-05-01 | Resolved: 2017-05-15

## 2021-07-14 PROBLEM — Z67.91 RH NEGATIVE, DELIVERED, CURRENT HOSPITALIZATION: Status: RESOLVED | Noted: 2017-05-15 | Resolved: 2018-02-15

## 2021-07-14 PROBLEM — O26.899 RH NEGATIVE, DELIVERED, CURRENT HOSPITALIZATION: Status: RESOLVED | Noted: 2017-05-15 | Resolved: 2018-02-15

## 2021-08-12 ENCOUNTER — TELEPHONE (OUTPATIENT)
Dept: FAMILY MEDICINE | Facility: CLINIC | Age: 41
End: 2021-08-12

## 2021-08-12 DIAGNOSIS — F34.1 DYSTHYMIC DISORDER: ICD-10-CM

## 2021-08-12 RX ORDER — BUPROPION HYDROCHLORIDE 300 MG/1
TABLET ORAL
Qty: 30 TABLET | Refills: 0 | Status: SHIPPED | OUTPATIENT
Start: 2021-08-12 | End: 2021-09-07

## 2021-08-12 NOTE — LETTER
[facility] DESIRAE CALVILLO  [address]  [place] [address]  Phone: [phone]  Fax: [phone]        August 19, 2021      Mary Jo Aparicio                                                                                                                                [address]  [place] [address]            Dear Ms. Aparicio,    We are concerned about your health care.  We recently provided you with a medication refill.  Many medications require routine follow-up with your Doctor.      At this time we ask that: You schedule a routine office visit with your physician to follow your Wellbutrin and Zoloft     Your prescription: Has been refilled for 1 month so you may have time for the above noted follow-up.      Thank you,      [facility] [place] [place]

## 2021-08-12 NOTE — TELEPHONE ENCOUNTER
Reason for Call:  Medication or medication refill:    Do you use a [facility]?  Name of the pharmacy and phone number for the current request:  [facility]    Name of the medication requested: WELLBUTRIN AND ZOLOFT    Other request: PT IS OUT OF MEDICATION    Can we leave a detailed message on this number? YES    Phone number patient can be reached at: Home number on file [phone] (home)    Best Time: ANYTIME    Call taken on 8/12/2021 at 8:28 AM by Harinder Cristina

## 2021-08-16 NOTE — TELEPHONE ENCOUNTER
Left message to call back for: pt  Information to relay to patient: 30 day supply was sent to pt on 8/12/2021, needs apt and labs prior to any further refills. Please help pt get in in the next 30 days to avoid any refill request delays.

## 2021-08-19 NOTE — TELEPHONE ENCOUNTER
Left message to call back for: pt  Information to relay to patient: message below         Letter sent via mail

## 2021-09-09 DIAGNOSIS — F34.1 DYSTHYMIC DISORDER: ICD-10-CM

## 2021-09-10 RX ORDER — BUPROPION HYDROCHLORIDE 300 MG/1
TABLET ORAL
Qty: 30 TABLET | Refills: 0 | Status: SHIPPED | OUTPATIENT
Start: 2021-09-10 | End: 2021-09-21

## 2021-09-10 NOTE — TELEPHONE ENCOUNTER
"Routing refill request to provider for review/approval because:  Labs out of range:  PHQ-9  Patient had a small supply refilled on 9/7/2021 until upcoming appointment.   PCP please advise.    Last Written Prescription Date:  9/7/2021  Last Fill Quantity: 30,  # refills: 0   Last office visit provider:  5/19/2021     Requested Prescriptions   Pending Prescriptions Disp Refills     buPROPion (WELLBUTRIN XL) 300 MG 24 hr tablet [Pharmacy Med Name: BUPROPION XL 300MG TABLETS] 30 tablet 0     Sig: TAKE 1 TABLET(300 MG) BY MOUTH EVERY MORNING       SSRIs Protocol Failed - 9/9/2021  8:05 AM        Failed - PHQ-9 score less than 5 in past 6 months     Please review last PHQ-9 score.           Passed - Medication is Bupropion     If the medication is Bupropion (Wellbutrin), and the patient is taking for smoking cessation; OK to refill.          Passed - Medication is active on med list        Passed - Patient is age 18 or older        Passed - No active pregnancy on record        Passed - No positive pregnancy test in last 12 months        Passed - Recent (6 mo) or future (30 days) visit within the authorizing provider's specialty     Patient had office visit in the last 6 months or has a visit in the next 30 days with authorizing provider or within the authorizing provider's specialty.  See \"Patient Info\" tab in inbasket, or \"Choose Columns\" in Meds & Orders section of the refill encounter.                 Kita Srivastava RN 09/09/21 10:06 PM  "

## 2021-09-10 NOTE — TELEPHONE ENCOUNTER
30 day supply sent to pharmacy to bridge patient until scheduled follow-up appointment on 9/21/21.   José Miguel Early MD [facility]

## 2021-09-21 ENCOUNTER — OFFICE VISIT (OUTPATIENT)
Dept: FAMILY MEDICINE | Facility: CLINIC | Age: 41
End: 2021-09-21
Payer: COMMERCIAL

## 2021-09-21 VITALS
HEIGHT: 68 IN | OXYGEN SATURATION: 99 % | DIASTOLIC BLOOD PRESSURE: 78 MMHG | SYSTOLIC BLOOD PRESSURE: 122 MMHG | WEIGHT: 197.38 LBS | BODY MASS INDEX: 29.91 KG/M2 | HEART RATE: 103 BPM

## 2021-09-21 DIAGNOSIS — Z23 FLU VACCINE NEED: ICD-10-CM

## 2021-09-21 DIAGNOSIS — F34.1 DYSTHYMIC DISORDER: Primary | ICD-10-CM

## 2021-09-21 DIAGNOSIS — F41.1 ANXIETY STATE: ICD-10-CM

## 2021-09-21 DIAGNOSIS — G43.909 MIGRAINE WITHOUT STATUS MIGRAINOSUS, NOT INTRACTABLE, UNSPECIFIED MIGRAINE TYPE: ICD-10-CM

## 2021-09-21 PROCEDURE — 99214 OFFICE O/P EST MOD 30 MIN: CPT | Mod: 25 | Performed by: FAMILY MEDICINE

## 2021-09-21 PROCEDURE — 90686 IIV4 VACC NO PRSV 0.5 ML IM: CPT | Performed by: FAMILY MEDICINE

## 2021-09-21 PROCEDURE — 90471 IMMUNIZATION ADMIN: CPT | Performed by: FAMILY MEDICINE

## 2021-09-21 RX ORDER — SERTRALINE HYDROCHLORIDE 100 MG/1
100 TABLET, FILM COATED ORAL DAILY
Qty: 30 TABLET | Refills: 5 | Status: SHIPPED | OUTPATIENT
Start: 2021-09-21 | End: 2021-12-03

## 2021-09-21 RX ORDER — BUPROPION HYDROCHLORIDE 300 MG/1
TABLET ORAL
Qty: 30 TABLET | Refills: 5 | Status: SHIPPED | OUTPATIENT
Start: 2021-09-21 | End: 2023-05-01

## 2021-09-21 ASSESSMENT — MIFFLIN-ST. JEOR: SCORE: 1613.79

## 2021-09-21 NOTE — PROGRESS NOTES
Assessment & Plan:         ICD-10-CM    1. Dysthymic disorder  F34.1 sertraline (ZOLOFT) 100 MG tablet     buPROPion (WELLBUTRIN XL) 300 MG 24 hr tablet   2. Anxiety  F41.1    3. Migraine without status migrainosus, not intractable, unspecified migraine type  G43.909    4. Flu vaccine need  Z23 INFLUENZA VACCINE IM >6 MO VALENT IIV4 (ALFURIA/FLUZONE)        We reviewed the etiology and natural history for depression/anxiety and options for treatment. We elected to increase sertraline to 100 mg, and continue the wellbutrin 300 mg, and we discussed dose titration. We reviewed the potential side effects, need to taper the medications gradually, and indications for urgent evaluation. She will continue her same medications otherwise, and let me know if she has any significant problems or concerns. She should f/u in 4-6 mos.        Subjective:         Mary Jo Aparicio is a 40 year old female who presents for follow up depression and anxiety. She has been treated for these symptoms for several years. Symptoms have been waxing and waning since that time. She has the following symptoms: depressed mood, diminished interest or pleasure in activities, insomnia, excessive sleepiness, feelings of guilt, difficulty with concentration. Current symptoms also include feeling on edge/nervous/anxious, difficulty controlling worry, worrying about many different things, trouble relaxing and becoming easily annoyed or irritable. Patient denies hopelessness, feelings of worthlessness, recurrent thoughts of death or suicide. She denies current suicidal and homicidal ideation.        Current treatment for depression: medication(s) Wellbutrin XL (bupropion) 300 mg daily: sertraline 50 mg daily. She complains of the following side effects from the treatment: none.        The following portions of the patient's history were reviewed and updated as appropriate: allergies, current medications, past family history, past medical history, past social  "history, past surgical history and problem list.     Review of Systems  12 point ROS negative except as noted above       Objective:      /78 (Patient Position: Sitting, Cuff Size: Adult Regular)   Pulse 103   Ht 1.727 m (5' 8\")   Wt 89.5 kg (197 lb 6 oz)   SpO2 99%   BMI 30.01 kg/m    GEN: Alert and oriented, NAD, well nourished  SKIN:  Normal skin turgor, no lesions/rashes   HEENT: NC/AT, moist mucous membranes.    NECK: Normal.    CV: Regular rate and rhythm.   LUNGS: Clear. Normal respirations.   EXTREMITY: No edema, cyanosis  NEURO: Grossly normal.         Mental Status Examination  Dress, grooming, personal hygiene: normal  Speech: normal  Mood: normal, flat affect  Judgment: normal   "

## 2021-09-21 NOTE — LETTER
My Depression Action Plan  Name: Mary Jo Aparicio   Date of Birth 1980  Date: 10/13/2021    My doctor: Nayeli Webb   My clinic: [facility] [place]  [address] [facility], [address]  [place] [address]  [phone]          GREEN    ZONE   Good Control    What it looks like:     Things are going generally well. You have normal ups and downs. You may even feel depressed from time to time, but bad moods usually last less than a day.   What you need to do:  1. Continue to care for yourself (see self care plan)  2. Check your depression survival kit and update it as needed  3. Follow your physician s recommendations including any medication.  4. Do not stop taking medication unless you consult with your physician first.           YELLOW         ZONE Getting Worse    What it looks like:     Depression is starting to interfere with your life.     It may be hard to get out of bed; you may be starting to isolate yourself from others.    Symptoms of depression are starting to last most all day and this has happened for several days.     You may have suicidal thoughts but they are not constant.   What you need to do:     1. Call your care team. Your response to treatment will improve if you keep your care team informed of your progress. Yellow periods are signs an adjustment may need to be made.     2. Continue your self-care.  Just get dressed and ready for the day.  Don't give yourself time to talk yourself out of it.    3. Talk to someone in your support network.    4. Open up your Depression Self-Care Plan/Wellness Kit.           RED    ZONE Medical Alert - Get Help    What it looks like:     Depression is seriously interfering with your life.     You may experience these or other symptoms: You can t get out of bed most days, can t work or engage in other necessary activities, you have trouble taking care of basic hygiene, or basic responsibilities,  thoughts of suicide or death that will not go away, self-injurious behavior.     What you need to do:  1. Call your care team and request a same-day appointment. If they are not available (weekends or after hours) call your local crisis line, emergency room or 911.          Depression Self-Care Plan / Wellness Kit    Many people find that medication and therapy are helpful treatments for managing depression. In addition, making small changes to your everyday life can help to boost your mood and improve your wellbeing. Below are some tips for you to consider. Be sure to talk with your medical provider and/or behavioral health consultant if your symptoms are worsening or not improving.     Sleep   Sleep hygiene  means all of the habits that support good, restful sleep. It includes maintaining a consistent bedtime and wake time, using your bedroom only for sleeping or sex, and keeping the bedroom dark and free of distractions like a computer, smartphone, or television.     Develop a Healthy Routine  Maintain good hygiene. Get out of bed in the morning, make your bed, brush your teeth, take a shower, and get dressed. Don t spend too much time viewing media that makes you feel stressed. Find time to relax each day.    Exercise  Get some form of exercise every day. This will help reduce pain and release endorphins, the  feel good  chemicals in your brain. It can be as simple as just going for a walk or doing some gardening, anything that will get you moving.      Diet  Strive to eat healthy foods, including fruits and vegetables. Drink plenty of water. Avoid excessive sugar, caffeine, alcohol, and other mood-altering substances.     Stay Connected with Others  Stay in touch with friends and family members.    Manage Your Mood  Try deep breathing, massage therapy, biofeedback, or meditation. Take part in fun activities when you can. Try to find something to smile about each day.     Psychotherapy  Be open to working with a  therapist if your provider recommends it.     Medication  Be sure to take your medication as prescribed. Most anti-depressants need to be taken every day. It usually takes several weeks for medications to work. Not all medicines work for all people. It is important to follow-up with your provider to make sure you have a treatment plan that is working for you. Do not stop your medication abruptly without first discussing it with your provider.    Crisis Resources   These hotlines are for both adults and children. They and are open 24 hours a day, 7 days a week unless noted otherwise.      National Suicide Prevention Lifeline   6-105-616-TALK (2518)      Crisis Text Line    www.crisistextline.org  Text HOME to 542342 from anywhere in the [place], anytime, about any type of crisis. A live, trained crisis counselor will receive the text and respond quickly.      Ben Lifeline for LGBTQ Youth  A national crisis intervention and suicide lifeline for LGBTQ youth under 25. Provides a safe place to talk without judgement. Call 1-966.695.6462; text START to 952622 or visit www.thetrevorproject.org to talk to a trained counselor.      For [facility] crisis numbers, visit the [facility] website at:  https://[place].gov/dhs/people-we-serve/adults/health-care/mental-health/resources/crisis-contacts.jsp

## 2021-09-23 ASSESSMENT — PATIENT HEALTH QUESTIONNAIRE - PHQ9
SUM OF ALL RESPONSES TO PHQ QUESTIONS 1-9: 10
5. POOR APPETITE OR OVEREATING: MORE THAN HALF THE DAYS

## 2021-09-23 ASSESSMENT — ANXIETY QUESTIONNAIRES
1. FEELING NERVOUS, ANXIOUS, OR ON EDGE: NEARLY EVERY DAY
3. WORRYING TOO MUCH ABOUT DIFFERENT THINGS: MORE THAN HALF THE DAYS
5. BEING SO RESTLESS THAT IT IS HARD TO SIT STILL: SEVERAL DAYS
6. BECOMING EASILY ANNOYED OR IRRITABLE: NEARLY EVERY DAY
GAD7 TOTAL SCORE: 13
2. NOT BEING ABLE TO STOP OR CONTROL WORRYING: MORE THAN HALF THE DAYS
7. FEELING AFRAID AS IF SOMETHING AWFUL MIGHT HAPPEN: NOT AT ALL

## 2021-09-24 ASSESSMENT — ANXIETY QUESTIONNAIRES: GAD7 TOTAL SCORE: 13

## 2021-10-05 DIAGNOSIS — F34.1 DYSTHYMIC DISORDER: ICD-10-CM

## 2021-10-05 NOTE — TELEPHONE ENCOUNTER
Disp Refills Start End PANFILO    sertraline (ZOLOFT) 50 MG tablet (Discontinued) 30 tablet 0 9/7/2021 9/21/2021 No   Sig: TAKE 1 TABLET(50 MG) BY MOUTH DAILY   Sent to pharmacy as: Sertraline HCl 50 MG Oral Tablet (ZOLOFT)   Class: E-Prescribe   Notes to Pharmacy: Notify pt: due for med check .   Reason for Discontinue: Reorder   Order: [id number]   E-Prescribing Status: Receipt confirmed by pharmacy (9/7/2021 11:56 PM CDT)

## 2021-10-31 DIAGNOSIS — F34.1 DYSTHYMIC DISORDER: ICD-10-CM

## 2021-11-01 RX ORDER — SERTRALINE HYDROCHLORIDE 100 MG/1
TABLET, FILM COATED ORAL
Qty: 30 TABLET | Refills: 5 | OUTPATIENT
Start: 2021-11-01

## 2022-01-01 NOTE — TELEPHONE ENCOUNTER
Left message with callback number.   Refill Approved    Rx renewed per Medication Renewal Policy. Medication was last renewed on 8/10/20.    Nick Eldridge, [facility] Triage/Med Refill 5/11/2021     Requested Prescriptions   Pending Prescriptions Disp Refills     buPROPion (WELLBUTRIN XL) 300 MG 24 hr tablet 90 tablet 2       Tricyclics/Misc Antidepressant/Antianxiety Meds Refill Protocol Passed - 5/10/2021  3:04 PM        Passed - PCP or prescribing provider visit in last year     Last office visit with prescriber/PCP: 5/19/2020 Nayeli Webb MD OR same dept: 5/19/2020 Nayeli Webb MD OR same specialty: 5/19/2020 Nayeli Webb MD  Last physical: Visit date not found Last MTM visit: Visit date not found   Next visit within 3 mo: Visit date not found  Next physical within 3 mo: Visit date not found  Prescriber OR PCP: Nayeli Webb MD  Last diagnosis associated with med order: 1. Dysthymic disorder  - buPROPion (WELLBUTRIN XL) 300 MG 24 hr tablet  Dispense: 90 tablet; Refill: 2    If protocol passes may refill for 12 months if within 3 months of last provider visit (or a total of 15 months).

## 2022-07-17 ENCOUNTER — HEALTH MAINTENANCE LETTER (OUTPATIENT)
Age: 42
End: 2022-07-17

## 2022-07-19 ENCOUNTER — OFFICE VISIT (OUTPATIENT)
Dept: FAMILY MEDICINE | Facility: CLINIC | Age: 42
End: 2022-07-19
Payer: COMMERCIAL

## 2022-07-19 VITALS
TEMPERATURE: 98.4 F | WEIGHT: 199.6 LBS | SYSTOLIC BLOOD PRESSURE: 100 MMHG | HEIGHT: 68 IN | HEART RATE: 108 BPM | BODY MASS INDEX: 30.25 KG/M2 | OXYGEN SATURATION: 100 % | DIASTOLIC BLOOD PRESSURE: 78 MMHG

## 2022-07-19 DIAGNOSIS — Z97.5 IUD (INTRAUTERINE DEVICE) IN PLACE: Primary | ICD-10-CM

## 2022-07-19 PROCEDURE — 99213 OFFICE O/P EST LOW 20 MIN: CPT | Performed by: NURSE PRACTITIONER

## 2022-07-19 ASSESSMENT — PATIENT HEALTH QUESTIONNAIRE - PHQ9
SUM OF ALL RESPONSES TO PHQ QUESTIONS 1-9: 6
10. IF YOU CHECKED OFF ANY PROBLEMS, HOW DIFFICULT HAVE THESE PROBLEMS MADE IT FOR YOU TO DO YOUR WORK, TAKE CARE OF THINGS AT HOME, OR GET ALONG WITH OTHER PEOPLE: SOMEWHAT DIFFICULT
SUM OF ALL RESPONSES TO PHQ QUESTIONS 1-9: 6

## 2022-07-19 NOTE — PROGRESS NOTES
"  Assessment & Plan   Problem List Items Addressed This Visit    None     Visit Diagnoses     IUD (intrauterine device) in place    -  Primary         Strings visible on exam. 5 years since insertion in February of 2023.                BMI:   Estimated body mass index is 30.35 kg/m  as calculated from the following:    Height as of this encounter: 1.727 m (5' 8\").    Weight as of this encounter: 90.5 kg (199 lb 9.6 oz).       No follow-ups on file.    Janell Kenny [facility] [place]    Isabel Camargo is a 41 year old, presenting for the following health issues:  check for IUD      History of Present Illness       Reason for visit:  Can t feel iud    She eats 2-3 servings of fruits and vegetables daily.She consumes 0 sweetened beverage(s) daily.She exercises with enough effort to increase her heart rate 10 to 19 minutes per day.  She exercises with enough effort to increase her heart rate 3 or less days per week.     Today's PHQ-9         PHQ-9 Total Score: 6    PHQ-9 Q9 Thoughts of better off dead/self-harm past 2 weeks :   Not at all    How difficult have these problems made it for you to do your work, take care of things at home, or get along with other people: Somewhat difficult       Patient unable to locate IUD  Has had in place for about 4 years. Previous vaginal prolapse and nervious it may have moved.     Review of Systems   Constitutional, HEENT, cardiovascular, pulmonary, GI, , musculoskeletal, neuro, skin, endocrine and psych systems are negative, except as otherwise noted.      Objective    /78   Pulse 108   Temp 98.4  F (36.9  C)   Ht 1.727 m (5' 8\")   Wt 90.5 kg (199 lb 9.6 oz)   SpO2 100%   BMI 30.35 kg/m    Body mass index is 30.35 kg/m .  Physical Exam   GENERAL: healthy, alert and no distress   (female): normal female external genitalia, normal urethral meatus, vaginal mucosa, normal cervix/adnexa/uterus without masses or discharge, IUD strings visible. "               YOLANDE Osullivan     [address] [address]  [email or web address]   Office: [phone]                  .  ..

## 2022-09-26 ENCOUNTER — TELEPHONE (OUTPATIENT)
Dept: FAMILY MEDICINE | Facility: CLINIC | Age: 42
End: 2022-09-26

## 2022-09-26 NOTE — TELEPHONE ENCOUNTER
Needs of attention regarding:  -Depression  -Cervical Cancer Screening  -Wellness (Physical) Visit     Health Maintenance Topics with due status: Overdue       Topic Date Due    PREVENTIVE CARE VISIT Never done    ADVANCE CARE PLANNING Never done    DEPRESSION ACTION PLAN Never done    COVID-19 Vaccine 07/06/2021    HPV TEST 11/07/2021    PAP 11/07/2021    INFLUENZA VACCINE 09/01/2022    ANNUAL REVIEW OF HM ORDERS 09/21/2022       Communication:  See Letter, pt seen in [place] (IUD recheck)  normal PCP in [place]

## 2022-09-26 NOTE — LETTER
[facility]  [address]  [place] [address]  [place] [id number] [phone]       September 26, 2022    Mary Jo Aparicio  [address]  [place] [address]    Dear Mary Jo,    We care about your health and have reviewed your health plan and are making recommendations based on this review, to optimize your health.    You are in particular need of attention regarding:  -Cervical Cancer Screening  -Wellness (Physical) Visit     We are recommending that you:                                                                           -schedule a WELLNESS (Physical) APPOINTMENT. We will check fasting labs the same day. You should have nothing to eat except water and meds for 8-10 hours prior.                                                                                                                       -schedule a PAP SMEAR EXAM which is due.  Please disregard this reminder if you have had this exam elsewhere within the last year.  It would be helpful for us to have a copy of your recent pap smear report in our file so that we can best coordinate your care.    If you are under/uninsured, we recommend you contact the [facility]. They offer pap smears at no charge or on a sliding fee charge. You can schedule with them at [phone]. Please have them send us the results.                                                                                                In addition, here is a list of due or overdue Health Maintenance reminders.    Health Maintenance Due   Topic Date Due     Preventive Care Visit  Never done     Discuss Advance Care Planning  Never done     Depression Action Plan  Never done     COVID-19 Vaccine (3 - Booster for Pfizer series) 07/06/2021     HPV Screening  11/07/2021     PAP Smear  11/07/2021     Flu Vaccine (1) 09/01/2022     ANNUAL REVIEW OF HM ORDERS  09/21/2022       To address the above recommendations, we encourage  you to contact us at [phone], via GoTablet or by contacting Central Scheduling toll free at [phone] 24 hours a day. They will assist you with finding the most convenient time and location.    Thank you for trusting [facility] and we appreciate the opportunity to serve you.  We look forward to supporting your healthcare needs in the future.    Healthy Regards,    Your [facility] Team

## 2023-03-02 ENCOUNTER — OFFICE VISIT (OUTPATIENT)
Dept: FAMILY MEDICINE | Facility: CLINIC | Age: 43
End: 2023-03-02
Payer: COMMERCIAL

## 2023-03-02 VITALS
SYSTOLIC BLOOD PRESSURE: 118 MMHG | RESPIRATION RATE: 16 BRPM | BODY MASS INDEX: 28.34 KG/M2 | HEIGHT: 68 IN | HEART RATE: 95 BPM | OXYGEN SATURATION: 100 % | WEIGHT: 187 LBS | DIASTOLIC BLOOD PRESSURE: 80 MMHG | TEMPERATURE: 98.3 F

## 2023-03-02 DIAGNOSIS — F34.1 DYSTHYMIC DISORDER: ICD-10-CM

## 2023-03-02 DIAGNOSIS — F41.1 ANXIETY STATE: ICD-10-CM

## 2023-03-02 DIAGNOSIS — Z23 IMMUNIZATION DUE: ICD-10-CM

## 2023-03-02 DIAGNOSIS — Z30.09 COUNSELING FOR BIRTH CONTROL, INTRAUTERINE DEVICE: Primary | ICD-10-CM

## 2023-03-02 PROCEDURE — 90471 IMMUNIZATION ADMIN: CPT | Performed by: FAMILY MEDICINE

## 2023-03-02 PROCEDURE — 91312 COVID-19 VACCINE BIVALENT BOOSTER 12+ (PFIZER): CPT | Performed by: FAMILY MEDICINE

## 2023-03-02 PROCEDURE — 96127 BRIEF EMOTIONAL/BEHAV ASSMT: CPT | Performed by: FAMILY MEDICINE

## 2023-03-02 PROCEDURE — 90686 IIV4 VACC NO PRSV 0.5 ML IM: CPT | Performed by: FAMILY MEDICINE

## 2023-03-02 PROCEDURE — 99213 OFFICE O/P EST LOW 20 MIN: CPT | Mod: 25 | Performed by: FAMILY MEDICINE

## 2023-03-02 PROCEDURE — 0124A COVID-19 VACCINE BIVALENT BOOSTER 12+ (PFIZER): CPT | Performed by: FAMILY MEDICINE

## 2023-03-02 ASSESSMENT — ANXIETY QUESTIONNAIRES
2. NOT BEING ABLE TO STOP OR CONTROL WORRYING: MORE THAN HALF THE DAYS
GAD7 TOTAL SCORE: 14
GAD7 TOTAL SCORE: 14
5. BEING SO RESTLESS THAT IT IS HARD TO SIT STILL: SEVERAL DAYS
7. FEELING AFRAID AS IF SOMETHING AWFUL MIGHT HAPPEN: SEVERAL DAYS
4. TROUBLE RELAXING: NEARLY EVERY DAY
GAD7 TOTAL SCORE: 14
3. WORRYING TOO MUCH ABOUT DIFFERENT THINGS: SEVERAL DAYS
6. BECOMING EASILY ANNOYED OR IRRITABLE: NEARLY EVERY DAY
7. FEELING AFRAID AS IF SOMETHING AWFUL MIGHT HAPPEN: SEVERAL DAYS
IF YOU CHECKED OFF ANY PROBLEMS ON THIS QUESTIONNAIRE, HOW DIFFICULT HAVE THESE PROBLEMS MADE IT FOR YOU TO DO YOUR WORK, TAKE CARE OF THINGS AT HOME, OR GET ALONG WITH OTHER PEOPLE: SOMEWHAT DIFFICULT
1. FEELING NERVOUS, ANXIOUS, OR ON EDGE: NEARLY EVERY DAY
8. IF YOU CHECKED OFF ANY PROBLEMS, HOW DIFFICULT HAVE THESE MADE IT FOR YOU TO DO YOUR WORK, TAKE CARE OF THINGS AT HOME, OR GET ALONG WITH OTHER PEOPLE?: SOMEWHAT DIFFICULT

## 2023-03-02 ASSESSMENT — PATIENT HEALTH QUESTIONNAIRE - PHQ9
SUM OF ALL RESPONSES TO PHQ QUESTIONS 1-9: 15
10. IF YOU CHECKED OFF ANY PROBLEMS, HOW DIFFICULT HAVE THESE PROBLEMS MADE IT FOR YOU TO DO YOUR WORK, TAKE CARE OF THINGS AT HOME, OR GET ALONG WITH OTHER PEOPLE: SOMEWHAT DIFFICULT
SUM OF ALL RESPONSES TO PHQ QUESTIONS 1-9: 15

## 2023-03-02 ASSESSMENT — PAIN SCALES - GENERAL: PAINLEVEL: NO PAIN (0)

## 2023-03-02 NOTE — PROGRESS NOTES
Counseling for birth control, intrauterine device  I explained to the patient that in order for me to remove and replace her Mirena she would need to have an up-to-date Pap which she does not have.  She also needs to establish with a new physician and so I suggested she make an appointment before leaving the clinic to do so.  I explained to her that Mirena IUD should be effective for 1-2 more years and that she has time and that she can make an IUD appointment with me after she has completed this.  I reminded her to come for placement appointment with ibuprofen or naproxen on board.    Dysthymic disorder  Patient tells me that she weaned herself off of her medication.  That she is not on anything and thinks she should be able to deal with things with the therapist.  She does sound like she is seeing somebody.  However, her PHQ-9 score today is 15 and back when she saw Dr. Webb it was 6.    Anxiety  Her NINFA-7 score today is 14 which is not controlled either.    Immunization due  Patient is willing to have her COVID and flu shots today.  - INFLUENZA VACCINE IM > 6 MONTHS VALENT IIV4 (AFLURIA/FLUZONE)  - COVID-19 VACCINE BIVALENT BOOSTER 12+ (PFIZER)    She is to return to clinic at her earliest convenience to establish with a new available provider and to have a physical and Pap so that we can do her removal and replacement of Mirena IUD.  She can make that appointment without having another consult as long as she has done the above-mentioned.        Isabel Camargo is a 42 year old, presenting for the following health issues:  Consult (IUD replacement , insert 2/15/2018 )  This is a past patient of Dr. Webb's who had not seen her since September 2021.  At that time she was treated for anxiety and dysthymia and was on Wellbutrin and sertraline.  Dr. Webb had previously placed an IUD on 2/15/2018.  The patient was seen on 7/19/2022 by a nurse practitioner in [place] when she thought perhaps  she could not feel her IUD strings.  They were confirmed to be as they should at that visit.  She is here because it has been 5 years and she needs to get a replacement of her Mirena.    History of Present Illness       Reason for visit:  Iud replacement    She eats 2-3 servings of fruits and vegetables daily.She consumes 4 sweetened beverage(s) daily.She exercises with enough effort to increase her heart rate 10 to 19 minutes per day.  She exercises with enough effort to increase her heart rate 3 or less days per week.     Today's PHQ-9         PHQ-9 Total Score: 15    PHQ-9 Q9 Thoughts of better off dead/self-harm past 2 weeks :   Not at all    How difficult have these problems made it for you to do your work, take care of things at home, or get along with other people: Somewhat difficult  Today's NINFA-7 Score: 14    PHQ-9:  Last PHQ-9 3/2/2023   1.  Little interest or pleasure in doing things 1   2.  Feeling down, depressed, or hopeless 1   3.  Trouble falling or staying asleep, or sleeping too much 3   4.  Feeling tired or having little energy 3   5.  Poor appetite or overeating 2   6.  Feeling bad about yourself 3   7.  Trouble concentrating 2   8.  Moving slowly or restless 0   Q9: Thoughts of better off dead/self-harm past 2 weeks 0   PHQ-9 Total Score 15   Difficulty at work, home, or with people -       GAD7:  NINFA-7  3/2/2023   1. Feeling nervous, anxious, or on edge 3   2. Not being able to stop or control worrying 2   3. Worrying too much about different things 1   4. Trouble relaxing 3   5. Being so restless that it is hard to sit still 1   6. Becoming easily annoyed or irritable 3   7. Feeling afraid, as if something awful might happen 1   NINFA-7 Total Score 14   If you checked any problems, how difficult have they made it for you to do your work, take care of things at home, or get along with other people? Somewhat difficult            Objective    /80   Pulse 95   Temp 98.3  F (36.8  C)   Resp  "16   Ht 1.727 m (5' 8\")   Wt 84.8 kg (187 lb)   SpO2 100%   BMI 28.43 kg/m    Body mass index is 28.43 kg/m .  Physical Exam   GENERAL: healthy, alert, no distress and over weight  RESP: lungs clear to auscultation - no rales, rhonchi or wheezes  CV: regular rates and rhythm and without murmur  ABDOMEN: soft, nontender  MS: no gross musculoskeletal defects noted, no edema  PSYCH: mentation appears normal and affect flat              "

## 2023-04-10 NOTE — TELEPHONE ENCOUNTER
RN cannot approve Refill Request    RN can NOT refill this medication Protocol failed and NO refill given.         Anh Purvis, [facility] Triage/Med Refill 9/17/2019    Requested Prescriptions   Pending Prescriptions Disp Refills     buPROPion (WELLBUTRIN XL) 150 MG 24 hr tablet [Pharmacy Med Name: BUPROPION XL 150MG TABLETS (24 H)] 90 tablet 3     Sig: TAKE 1 TABLET BY MOUTH EVERY MORNING       Tricyclics/Misc Antidepressant/Antianxiety Meds Refill Protocol Failed - 9/17/2019  3:24 AM        Failed - PCP or prescribing provider visit in last year     Last office visit with prescriber/PCP: Visit date not found OR same dept: Visit date not found OR same specialty: 6/15/2018 Nayeli Webb MD  Last physical: Visit date not found Last MTM visit: Visit date not found   Next visit within 3 mo: Visit date not found  Next physical within 3 mo: Visit date not found  Prescriber OR PCP: Tyree Hobson CNP  Last diagnosis associated with med order: 1. Dysthymic disorder  - buPROPion (WELLBUTRIN XL) 150 MG 24 hr tablet [Pharmacy Med Name: BUPROPION XL 150MG TABLETS (24 H)]; TAKE 1 TABLET BY MOUTH EVERY MORNING  Dispense: 30 tablet; Refill: 0  - sertraline (ZOLOFT) 100 MG tablet [Pharmacy Med Name: SERTRALINE 100MG TABLETS]; TAKE 1 TABLET BY MOUTH DAILY  Dispense: 30 tablet; Refill: 0    If protocol passes may refill for 12 months if within 3 months of last provider visit (or a total of 15 months).             sertraline (ZOLOFT) 100 MG tablet [Pharmacy Med Name: SERTRALINE 100MG TABLETS] 90 tablet 3     Sig: TAKE 1 TABLET BY MOUTH DAILY       [facility] Refill Protocol  Failed - 9/17/2019  3:24 AM        Failed - PCP or prescribing provider visit in last year     Last office visit with prescriber/PCP: Visit date not found OR same dept: Visit date not found OR same specialty: 6/15/2018 Nayeli Webb MD  Last physical: Visit date not found Last MTM visit: Visit date not found   Next visit within 3 mo: Visit  date not found  Next physical within 3 mo: Visit date not found  Prescriber OR PCP: Tyree Hobson CNP  Last diagnosis associated with med order: 1. Dysthymic disorder  - buPROPion (WELLBUTRIN XL) 150 MG 24 hr tablet [Pharmacy Med Name: BUPROPION XL 150MG TABLETS (24 H)]; TAKE 1 TABLET BY MOUTH EVERY MORNING  Dispense: 30 tablet; Refill: 0  - sertraline (ZOLOFT) 100 MG tablet [Pharmacy Med Name: SERTRALINE 100MG TABLETS]; TAKE 1 TABLET BY MOUTH DAILY  Dispense: 30 tablet; Refill: 0    If protocol passes may refill for 12 months if within 3 months of last provider visit (or a total of 15 months).                 per past

## 2023-05-01 ENCOUNTER — OFFICE VISIT (OUTPATIENT)
Dept: FAMILY MEDICINE | Facility: CLINIC | Age: 43
End: 2023-05-01
Payer: COMMERCIAL

## 2023-05-01 VITALS
SYSTOLIC BLOOD PRESSURE: 124 MMHG | OXYGEN SATURATION: 99 % | TEMPERATURE: 97.6 F | HEART RATE: 69 BPM | WEIGHT: 182 LBS | RESPIRATION RATE: 16 BRPM | HEIGHT: 67 IN | DIASTOLIC BLOOD PRESSURE: 76 MMHG | BODY MASS INDEX: 28.56 KG/M2

## 2023-05-01 DIAGNOSIS — R53.83 FATIGUE, UNSPECIFIED TYPE: ICD-10-CM

## 2023-05-01 DIAGNOSIS — Z13.220 LIPID SCREENING: ICD-10-CM

## 2023-05-01 DIAGNOSIS — Z12.4 CERVICAL CANCER SCREENING: ICD-10-CM

## 2023-05-01 DIAGNOSIS — D22.9 NUMEROUS MOLES: ICD-10-CM

## 2023-05-01 DIAGNOSIS — Z00.00 ROUTINE GENERAL MEDICAL EXAMINATION AT A HEALTH CARE FACILITY: Primary | ICD-10-CM

## 2023-05-01 DIAGNOSIS — L98.9 SKIN LESION: ICD-10-CM

## 2023-05-01 DIAGNOSIS — R22.9 LOCALIZED SUPERFICIAL SWELLING, MASS, OR LUMP: ICD-10-CM

## 2023-05-01 DIAGNOSIS — Z13.0 SCREENING FOR DISORDER OF BLOOD AND BLOOD-FORMING ORGANS: ICD-10-CM

## 2023-05-01 LAB
ANION GAP SERPL CALCULATED.3IONS-SCNC: 9 MMOL/L (ref 7–15)
BUN SERPL-MCNC: 13 MG/DL (ref 6–20)
CALCIUM SERPL-MCNC: 9.4 MG/DL (ref 8.6–10)
CHLORIDE SERPL-SCNC: 104 MMOL/L (ref 98–107)
CHOLEST SERPL-MCNC: 157 MG/DL
CREAT SERPL-MCNC: 1.01 MG/DL (ref 0.51–0.95)
DEPRECATED HCO3 PLAS-SCNC: 22 MMOL/L (ref 22–29)
ERYTHROCYTE [DISTWIDTH] IN BLOOD BY AUTOMATED COUNT: 12.1 % (ref 10–15)
GFR SERPL CREATININE-BSD FRML MDRD: 71 ML/MIN/1.73M2
GLUCOSE SERPL-MCNC: 84 MG/DL (ref 70–99)
HCT VFR BLD AUTO: 38.5 % (ref 35–47)
HDLC SERPL-MCNC: 50 MG/DL
HGB BLD-MCNC: 12.7 G/DL (ref 11.7–15.7)
LDLC SERPL CALC-MCNC: 95 MG/DL
MCH RBC QN AUTO: 30 PG (ref 26.5–33)
MCHC RBC AUTO-ENTMCNC: 33 G/DL (ref 31.5–36.5)
MCV RBC AUTO: 91 FL (ref 78–100)
NONHDLC SERPL-MCNC: 107 MG/DL
PLATELET # BLD AUTO: 303 10E3/UL (ref 150–450)
POTASSIUM SERPL-SCNC: 4.4 MMOL/L (ref 3.4–5.3)
RBC # BLD AUTO: 4.23 10E6/UL (ref 3.8–5.2)
SODIUM SERPL-SCNC: 135 MMOL/L (ref 136–145)
TRIGL SERPL-MCNC: 61 MG/DL
TSH SERPL DL<=0.005 MIU/L-ACNC: 1.49 UIU/ML (ref 0.3–4.2)
WBC # BLD AUTO: 6.5 10E3/UL (ref 4–11)

## 2023-05-01 PROCEDURE — 80061 LIPID PANEL: CPT | Performed by: NURSE PRACTITIONER

## 2023-05-01 PROCEDURE — 84443 ASSAY THYROID STIM HORMONE: CPT | Performed by: NURSE PRACTITIONER

## 2023-05-01 PROCEDURE — 87624 HPV HI-RISK TYP POOLED RSLT: CPT | Performed by: NURSE PRACTITIONER

## 2023-05-01 PROCEDURE — 36415 COLL VENOUS BLD VENIPUNCTURE: CPT | Performed by: NURSE PRACTITIONER

## 2023-05-01 PROCEDURE — 99396 PREV VISIT EST AGE 40-64: CPT | Performed by: NURSE PRACTITIONER

## 2023-05-01 PROCEDURE — 80048 BASIC METABOLIC PNL TOTAL CA: CPT | Performed by: NURSE PRACTITIONER

## 2023-05-01 PROCEDURE — 99214 OFFICE O/P EST MOD 30 MIN: CPT | Mod: 25 | Performed by: NURSE PRACTITIONER

## 2023-05-01 PROCEDURE — 85027 COMPLETE CBC AUTOMATED: CPT | Performed by: NURSE PRACTITIONER

## 2023-05-01 PROCEDURE — G0145 SCR C/V CYTO,THINLAYER,RESCR: HCPCS | Performed by: NURSE PRACTITIONER

## 2023-05-01 RX ORDER — ESCITALOPRAM OXALATE 10 MG/1
1 TABLET ORAL DAILY
COMMUNITY
Start: 2023-04-25

## 2023-05-01 ASSESSMENT — ENCOUNTER SYMPTOMS
NAUSEA: 0
HEADACHES: 1
DIZZINESS: 0
COUGH: 0
ARTHRALGIAS: 0
SORE THROAT: 0
SHORTNESS OF BREATH: 0
HEMATURIA: 0
BREAST MASS: 0
EYE PAIN: 0
CHILLS: 0
FREQUENCY: 0
CONSTIPATION: 0
FEVER: 0
WEAKNESS: 0
PALPITATIONS: 0
DIARRHEA: 0
PARESTHESIAS: 0
HEARTBURN: 0
NERVOUS/ANXIOUS: 1
HEMATOCHEZIA: 0
JOINT SWELLING: 0
MYALGIAS: 1
DYSURIA: 0
ABDOMINAL PAIN: 0

## 2023-05-01 ASSESSMENT — PATIENT HEALTH QUESTIONNAIRE - PHQ9
10. IF YOU CHECKED OFF ANY PROBLEMS, HOW DIFFICULT HAVE THESE PROBLEMS MADE IT FOR YOU TO DO YOUR WORK, TAKE CARE OF THINGS AT HOME, OR GET ALONG WITH OTHER PEOPLE: SOMEWHAT DIFFICULT
SUM OF ALL RESPONSES TO PHQ QUESTIONS 1-9: 6
SUM OF ALL RESPONSES TO PHQ QUESTIONS 1-9: 6

## 2023-05-01 ASSESSMENT — PAIN SCALES - GENERAL: PAINLEVEL: NO PAIN (0)
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
## 2023-05-05 ENCOUNTER — OFFICE VISIT (OUTPATIENT)
Dept: SURGERY | Facility: CLINIC | Age: 43
End: 2023-05-05
Payer: COMMERCIAL

## 2023-05-05 VITALS
DIASTOLIC BLOOD PRESSURE: 78 MMHG | SYSTOLIC BLOOD PRESSURE: 126 MMHG | BODY MASS INDEX: 28.06 KG/M2 | HEIGHT: 67 IN | WEIGHT: 178.8 LBS

## 2023-05-05 DIAGNOSIS — R22.9 LOCALIZED SUPERFICIAL SWELLING, MASS, OR LUMP: ICD-10-CM

## 2023-05-05 PROCEDURE — 99213 OFFICE O/P EST LOW 20 MIN: CPT | Mod: 57 | Performed by: SURGERY

## 2023-05-05 PROCEDURE — 88304 TISSUE EXAM BY PATHOLOGIST: CPT | Performed by: PATHOLOGY

## 2023-05-05 PROCEDURE — 25075 EXC FOREARM LES SC < 3 CM: CPT | Mod: LT | Performed by: SURGERY
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
## 2023-05-05 NOTE — PROCEDURES
The 2 lipoma sites on the left forearm were marked.  The skin was then cleansed with alcohol before injecting 10 cc of local anesthetic.  And then reprepped with Betadine.  Incisions were made over both masses and the lipoma was bluntly dissected free, 1 measuring 2 x 2 cm and the other measuring 1 x 2 cm.  After ensuring hemostasis both wounds were closed with interrupted 4-0 Vicryl sutures.    Geo Bal MD, FACS  Office: [phone]  [facility]   General and Bariatric Surgery
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
## 2023-05-07 LAB
BKR LAB AP GYN ADEQUACY: NORMAL
BKR LAB AP GYN INTERPRETATION: NORMAL
BKR LAB AP HPV REFLEX: NORMAL
BKR LAB AP PREVIOUS ABNORMAL: NORMAL
PATH REPORT.COMMENTS IMP SPEC: NORMAL
PATH REPORT.COMMENTS IMP SPEC: NORMAL
PATH REPORT.RELEVANT HX SPEC: NORMAL

## 2023-05-08 LAB
HUMAN PAPILLOMA VIRUS 16 DNA: NEGATIVE
HUMAN PAPILLOMA VIRUS 18 DNA: NEGATIVE
HUMAN PAPILLOMA VIRUS FINAL DIAGNOSIS: NORMAL
HUMAN PAPILLOMA VIRUS OTHER HR: NEGATIVE
PATH REPORT.COMMENTS IMP SPEC: NORMAL
PATH REPORT.COMMENTS IMP SPEC: NORMAL
PATH REPORT.FINAL DX SPEC: NORMAL
PATH REPORT.GROSS SPEC: NORMAL
PATH REPORT.MICROSCOPIC SPEC OTHER STN: NORMAL
PATH REPORT.RELEVANT HX SPEC: NORMAL
PHOTO IMAGE: NORMAL

## 2023-06-26 NOTE — TELEPHONE ENCOUNTER
DIAGNOSIS: Pain in L Leg /Shin. Walks a lot daily . no known injury / self / [employer] / No Images   APPOINTMENT DATE: 06/28/23   NOTES STATUS DETAILS   MEDICATION LIST Internal

## 2023-06-28 ENCOUNTER — PRE VISIT (OUTPATIENT)
Dept: ORTHOPEDICS | Facility: CLINIC | Age: 43
End: 2023-06-28

## 2023-06-28 ENCOUNTER — OFFICE VISIT (OUTPATIENT)
Dept: ORTHOPEDICS | Facility: CLINIC | Age: 43
End: 2023-06-28
Payer: COMMERCIAL

## 2023-06-28 ENCOUNTER — ANCILLARY PROCEDURE (OUTPATIENT)
Dept: GENERAL RADIOLOGY | Facility: CLINIC | Age: 43
End: 2023-06-28
Attending: FAMILY MEDICINE
Payer: COMMERCIAL

## 2023-06-28 VITALS — WEIGHT: 178 LBS | HEIGHT: 67 IN | BODY MASS INDEX: 27.94 KG/M2

## 2023-06-28 DIAGNOSIS — M79.662 PAIN IN LEFT LOWER LEG: ICD-10-CM

## 2023-06-28 DIAGNOSIS — S86.892A MEDIAL TIBIAL STRESS SYNDROME, LEFT, INITIAL ENCOUNTER: ICD-10-CM

## 2023-06-28 DIAGNOSIS — M79.662 PAIN IN LEFT LOWER LEG: Primary | ICD-10-CM

## 2023-06-28 PROCEDURE — 73590 X-RAY EXAM OF LOWER LEG: CPT | Mod: LT | Performed by: RADIOLOGY

## 2023-06-28 PROCEDURE — 99203 OFFICE O/P NEW LOW 30 MIN: CPT | Performed by: FAMILY MEDICINE
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
## 2024-04-01 ENCOUNTER — PATIENT OUTREACH (OUTPATIENT)
Dept: CARE COORDINATION | Facility: CLINIC | Age: 44
End: 2024-04-01
Payer: COMMERCIAL

## 2024-04-15 ENCOUNTER — PATIENT OUTREACH (OUTPATIENT)
Dept: CARE COORDINATION | Facility: CLINIC | Age: 44
End: 2024-04-15
Payer: COMMERCIAL

## 2024-11-26 ENCOUNTER — APPOINTMENT (OUTPATIENT)
Dept: URBAN - METROPOLITAN AREA CLINIC 260 | Age: 44
Setting detail: DERMATOLOGY
End: 2024-11-26

## 2024-11-26 VITALS — WEIGHT: 293 LBS | HEIGHT: 67 IN | RESPIRATION RATE: 14 BRPM

## 2024-11-26 DIAGNOSIS — D49.2 NEOPLASM OF UNSPECIFIED BEHAVIOR OF BONE, SOFT TISSUE, AND SKIN: ICD-10-CM

## 2024-11-26 PROCEDURE — OTHER SEPARATE AND IDENTIFIABLE DOCUMENTATION: OTHER

## 2024-11-26 PROCEDURE — 99202 OFFICE O/P NEW SF 15 MIN: CPT | Mod: 25

## 2024-11-26 PROCEDURE — OTHER BIOPSY BY SHAVE METHOD: OTHER

## 2024-11-26 PROCEDURE — OTHER MIPS QUALITY: OTHER

## 2024-11-26 PROCEDURE — OTHER PHOTO-DOCUMENTATION: OTHER

## 2024-11-26 PROCEDURE — 11102 TANGNTL BX SKIN SINGLE LES: CPT

## 2024-11-26 PROCEDURE — OTHER COUNSELING: OTHER

## 2024-11-26 ASSESSMENT — LOCATION DETAILED DESCRIPTION DERM: LOCATION DETAILED: RIGHT INFERIOR POSTERIOR NECK

## 2024-11-26 ASSESSMENT — LOCATION ZONE DERM: LOCATION ZONE: NECK

## 2024-11-26 ASSESSMENT — LOCATION SIMPLE DESCRIPTION DERM: LOCATION SIMPLE: POSTERIOR NECK

## 2024-11-26 NOTE — HPI: SKIN LESION
What Type Of Note Output Would You Prefer (Optional)?: Standard Output
How Severe Is Your Skin Lesion?: moderate
Has Your Skin Lesion Been Treated?: not been treated
Is This A New Presentation, Or A Follow-Up?: Skin Lesion
Additional History: Patient reports a spot on the back of her neck that has been there for years that scabs over, she reports it is sometimes tender and itchy

## 2024-11-26 NOTE — PROCEDURE: BIOPSY BY SHAVE METHOD
Detail Level: Detailed
Depth Of Biopsy: dermis
Was A Bandage Applied: Yes
Size Of Lesion In Cm: 0
Biopsy Type: H and E
Biopsy Method: double edge Personna blade
Anesthesia Type: 1% lidocaine with epinephrine and a 1:10 solution of 8.4% sodium bicarbonate
Anesthesia Volume In Cc: 0.5
Hemostasis: Drysol
Wound Care: Petrolatum
Dressing: bandage
Destruction After The Procedure: No
Type Of Destruction Used: Curettage
Curettage Text: The wound bed was treated with curettage after the biopsy was performed.
Cryotherapy Text: The wound bed was treated with cryotherapy after the biopsy was performed.
Electrodesiccation Text: The wound bed was treated with electrodesiccation after the biopsy was performed.
Electrodesiccation And Curettage Text: The wound bed was treated with electrodesiccation and curettage after the biopsy was performed.
Silver Nitrate Text: The wound bed was treated with silver nitrate after the biopsy was performed.
Lab: [id number]
Consent: Written consent was obtained and risks were reviewed including but not limited to scarring, infection, bleeding, scabbing, incomplete removal, nerve damage and allergy to anesthesia.
Post-Care Instructions: I reviewed with the patient in detail post-care instructions. Patient is to keep the biopsy site dry overnight, and then apply bacitracin twice daily until healed. Patient may apply hydrogen peroxide soaks to remove any crusting.
Notification Instructions: Patient will be notified of biopsy results. However, patient instructed to call the office if not contacted within 2 weeks.
Billing Type: Third-Party Bill
Information: Selecting Yes will display possible errors in your note based on the variables you have selected. This validation is only offered as a suggestion for you. PLEASE NOTE THAT THE VALIDATION TEXT WILL BE REMOVED WHEN YOU FINALIZE YOUR NOTE. IF YOU WANT TO FAX A PRELIMINARY NOTE YOU WILL NEED TO TOGGLE THIS TO 'NO' IF YOU DO NOT WANT IT IN YOUR FAXED NOTE.

## 2025-05-10 ENCOUNTER — HEALTH MAINTENANCE LETTER (OUTPATIENT)
Age: 45
End: 2025-05-10